# Patient Record
Sex: MALE | Race: WHITE | Employment: FULL TIME | URBAN - METROPOLITAN AREA
[De-identification: names, ages, dates, MRNs, and addresses within clinical notes are randomized per-mention and may not be internally consistent; named-entity substitution may affect disease eponyms.]

---
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
## 2022-01-03 ENCOUNTER — APPOINTMENT (OUTPATIENT)
Dept: PHYSICAL THERAPY | Age: 22
End: 2022-01-03
Payer: COMMERCIAL

## 2022-01-06 ENCOUNTER — HOSPITAL ENCOUNTER (OUTPATIENT)
Dept: PHYSICAL THERAPY | Age: 22
Setting detail: THERAPIES SERIES
Discharge: HOME | End: 2022-01-06
Payer: COMMERCIAL

## 2022-01-06 PROCEDURE — 97110 THERAPEUTIC EXERCISES: CPT

## 2022-01-06 ASSESSMENT — PAIN SCALES - GENERAL: PAINLEVEL_OUTOF10: 5

## 2022-01-06 ASSESSMENT — PAIN DESCRIPTION - DESCRIPTORS: DESCRIPTORS: ACHING

## 2022-01-06 ASSESSMENT — PAIN DESCRIPTION - PAIN TYPE: TYPE: CHRONIC PAIN

## 2022-01-06 ASSESSMENT — PAIN DESCRIPTION - LOCATION: LOCATION: NECK;BACK

## 2022-01-06 ASSESSMENT — PAIN DESCRIPTION - ORIENTATION: ORIENTATION: POSTERIOR

## 2022-01-06 NOTE — PROGRESS NOTES
[address] [facility]  Outpatient Physical Therapy    Treatment Note        Date: 2022  Patient: Zari Rubi  : 2000  ACCT #: [de-identified]  Referring Practitioner: VINNY Naqvi-CINTIA Bray  Diagnosis: Neck pain, chronic bilateral low back pain  Treatment Diagnosis: Cervical and low back pain with soft tissue tightness in the thoracic regions     Visit Information:  PT Visit Information  Onset Date: 21  PT Insurance Information: [employer]  Total # of Visits to Date: 2  Plan of Care/Certification Expiration Date: 21  No Show: 0  Canceled Appointment: 0  Progress Note Counter: 2//10    Subjective: Pt reports general soreness following last tx session. Pain generally worse at night following work, return to base line pain levels by morning. Oftern feels as thought my left hand doesn't work like I can't grab anything or hold anything with it. HEP Compliance:  [] Good [x] Fair [] Poor [] Reports not doing due to:    Vital Signs  Patient Currently in Pain: Yes   Pain Screening  Patient Currently in Pain: Yes  Pain Assessment  Pain Assessment: 0-10  Pain Level: 5  Pain Type: Chronic pain  Pain Location: Neck;Back  Pain Orientation: Posterior  Pain Descriptors: Aching    OBJECTIVE:   Exercises  Exercise 1: Supine chin Tucks / Scap retract / Elbow press  3s hold x 10  Exercise 2: SL open Book Stretch. 3s x 10 Vic. Exercise 3: Seated Ext in Chair 3s hold x 10  Exercise 4: Standing Ex against Table 3s hold x 10  Exercise 5: Corner Stretch 30s x 3 hand at head level. Exercise 6: Prone 2 min / LEATHA 2 min. Prone Press Up 3s hold  Exercise 7: prone Scap Series Horz ABD IR/ER , Lower Trap , Ext  Exercise 20: HEP: Open Book, Seated Ext, Standing Ext. Strength: [x] NT  [] MMT completed:     ROM: [x] NT  [] ROM measurements:        Assessment:         Body structures, Functions, Activity limitations: Decreased functional mobility ,Decreased strength,Decreased posture,Increased pain  Assessment: Multiple additions made to exercises plan with focus on mobility and gentle pain free strengthing of both back and cervical spines. Pt displayed limtied throacic rotation with Open Book exercises. Good ROM noted with Prone progression with increased comfort reported by pt. Treatment Diagnosis: Cervical and low back pain with soft tissue tightness in the thoracic regions  Prognosis: Good       Goals:  Short term goals  Time Frame for Short term goals: 3-5 treatments  Short term goal 1: Patient will have improved posture requiring less than 2 verbal  cues per session  Short term goal 2: No further scapular or upper back soft tissue tightness  Short term goal 3: Pain of the cervical area will be decreased to 2/20    Long term goals  Time Frame for Long term goals : 5-10 treatments  Long term goal 1: Pain of the cervical area decreased to 1/10 to 0/10  Long term goal 2: Pain of the low back area will be decreased to 1/10 to 0/10  Long term goal 3: Patient will demonstrate good upper body posture with no verbal cues  Long term goal 4:  strength of the left will improve by at least 10 points per position and more comparable to norms  Long term goal 5: Patient will be independent with home exercise program  Progress toward goals:multiple progressions made to exercise program and HEP. POST-PAIN       Pain Rating (0-10 pain scale):   4/10   Location and pain description same as pre-treatment unless indicated. Action: [] NA   [x] Perform HEP  [] Meds as prescribed  [] Modalities as prescribed   [] Call Physician     Frequency/Duration:  Plan  Times per week: 2  Plan weeks: 5  Current Treatment Recommendations: Strengthening,Pain Management,Manual Therapy - Soft Tissue Mobilization,Home Exercise Program     Pt to continue current HEP. See objective section for any therapeutic exercise changes, additions or modifications this date.          PT Individual Minutes  Time In: 1640  Time Out: 3242  Minutes: 40  Timed Code Treatment Minutes: 40 Minutes  Procedure Minutes:0     Timed Activity Minutes Units   Ther Ex 40 3       Signature:  Electronically signed by Adrianna Brown PTA on 1/6/22 at 5:28 PM EST

## 2022-01-12 ENCOUNTER — HOSPITAL ENCOUNTER (OUTPATIENT)
Dept: PHYSICAL THERAPY | Age: 22
Setting detail: THERAPIES SERIES
Discharge: HOME | End: 2022-01-12
Payer: COMMERCIAL

## 2022-01-12 PROCEDURE — 97110 THERAPEUTIC EXERCISES: CPT

## 2022-01-12 PROCEDURE — 97140 MANUAL THERAPY 1/> REGIONS: CPT

## 2022-01-12 ASSESSMENT — PAIN DESCRIPTION - ORIENTATION: ORIENTATION: POSTERIOR

## 2022-01-12 ASSESSMENT — PAIN DESCRIPTION - PAIN TYPE: TYPE: CHRONIC PAIN

## 2022-01-12 ASSESSMENT — PAIN SCALES - GENERAL: PAINLEVEL_OUTOF10: 5

## 2022-01-12 ASSESSMENT — PAIN DESCRIPTION - DESCRIPTORS: DESCRIPTORS: ACHING

## 2022-01-12 ASSESSMENT — PAIN DESCRIPTION - LOCATION: LOCATION: NECK;BACK

## 2022-01-12 NOTE — PROGRESS NOTES
[address]  Outpatient Physical Therapy    Treatment Note        Date: 2022  Patient: Gustavo Rubi  : 2000  ACCT #: [de-identified]  Referring Practitioner: VINNY Xiao-CINTIA Rudolph  Diagnosis: Neck pain, chronic bilateral low back pain  Treatment Diagnosis: Cervical and low back pain with soft tissue tightness in the thoracic regions     Visit Information:  PT Visit Information  Onset Date: 21  PT Insurance Information: [employer]  Total # of Visits to Date: 3  Plan of Care/Certification Expiration Date: 22  No Show: 0  Canceled Appointment: 0  Progress Note Counter: 3/10 (6/40 units by 22)    Subjective: Pt reports \"my neck and back are both about a 5/10 today. 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 Pt states he felt good after last session, but forgets to do his HEP most days. HEP Compliance:  [] Good [] Fair [x] Poor [x] Reports not doing due to: \"I forget\"    Vital Signs  Patient Currently in Pain: Yes   Pain Screening  Patient Currently in Pain: Yes  Pain Assessment  Pain Assessment: 0-10  Pain Level: 5  Pain Type: Chronic pain  Pain Location: Neck;Back  Pain Orientation: Posterior  Pain Descriptors: Aching    OBJECTIVE:   Exercises  Exercise 1: Supine chin Tucks / Scap retract / Elbow press  3s hold x 10  Exercise 2: SL open Book Stretch. 3s x 10 Vic. Exercise 3: Seated Ext in Chair 3s hold x 10  Exercise 4: Standing Ext against Table 3s hold x 10  Exercise 5: Corner Stretch 30s x 3 hand at head level. Exercise 6: Prone 2 min / LEATHA 2 min.  Prone Press Up 3s hold x10  Exercise 7: prone Scap Series Horz ABD IR/ER , Lower Trap , Ext x15 ea  Exercise 8: quadruped cat/cow 5''x10  Exercise 9: wall slides w/ lift off 5''x10  Exercise 20: HEP: cont current, increase compliance    Strength: [x] NT  [] MMT completed:    ROM: [x] NT  [] ROM measurements:     Manual:   Manual therapy  Soft Tissue Mobalization: tennis ball massage to thoracic paraspinals x10 mins    *Indicates exercise, modality, or manual techniques to be initiated when appropriate    Assessment: Body structures, Functions, Activity limitations: Decreased functional mobility ,Decreased strength,Decreased posture,Increased pain  Assessment: Encouragement and education given re: HEP compliance. Initiated cat/cow w/ pt demo'ing signficant limited thoracolumbar ext. Pt also requires VC/TC for correct technique. Treatment Diagnosis: Cervical and low back pain with soft tissue tightness in the thoracic regions  Prognosis: Good     Goals:  Short term goals  Time Frame for Short term goals: 3-5 treatments  Short term goal 1: Patient will have improved posture requiring less than 2 verbal  cues per session  Short term goal 2: No further scapular or upper back soft tissue tightness  Short term goal 3: Pain of the cervical area will be decreased to 2/20  Long term goals  Time Frame for Long term goals : 5-10 treatments  Long term goal 1: Pain of the cervical area decreased to 1/10 to 0/10  Long term goal 2: Pain of the low back area will be decreased to 1/10 to 0/10  Long term goal 3: Patient will demonstrate good upper body posture with no verbal cues  Long term goal 4:  strength of the left will improve by at least 10 points per position and more comparable to norms  Long term goal 5: Patient will be independent with home exercise program  Progress toward goals: dec tightness, improve postural awareness, dec pain    POST-PAIN       Pain Rating (0-10 pain scale):   4/10   Location and pain description same as pre-treatment unless indicated.    Action: [] NA   [x] Perform HEP  [] Meds as prescribed  [] Modalities as prescribed   [] Call Physician     Frequency/Duration:  Plan  Times per week: 2  Plan weeks: 5  Current Treatment Recommendations: Strengthening,Pain Management,Manual Therapy - Soft Tissue Mobilization,Home Exercise Program     Pt to

## 2022-01-13 ENCOUNTER — HOSPITAL ENCOUNTER (OUTPATIENT)
Dept: PHYSICAL THERAPY | Age: 22
Setting detail: THERAPIES SERIES
Discharge: HOME | End: 2022-01-13
Payer: COMMERCIAL

## 2022-01-13 PROCEDURE — 97110 THERAPEUTIC EXERCISES: CPT

## 2022-01-13 ASSESSMENT — PAIN SCALES - GENERAL: PAINLEVEL_OUTOF10: 2

## 2022-01-13 ASSESSMENT — PAIN DESCRIPTION - LOCATION: LOCATION: BACK

## 2022-01-13 ASSESSMENT — PAIN DESCRIPTION - PAIN TYPE: TYPE: CHRONIC PAIN

## 2022-01-13 ASSESSMENT — PAIN DESCRIPTION - ORIENTATION: ORIENTATION: UPPER

## 2022-01-13 ASSESSMENT — PAIN DESCRIPTION - DESCRIPTORS: DESCRIPTORS: ACHING

## 2022-01-13 NOTE — PROGRESS NOTES
[address] [facility]  Outpatient Physical Therapy    Treatment Note        Date: 2022  Patient: Edvin Gomez  : 2000  ACCT #: [de-identified]  Referring Practitioner: NOREEN Roberts  Diagnosis: Neck pain, chronic bilateral low back pain  Treatment Diagnosis: Cervical and low back pain with soft tissue tightness in the thoracic regions     Visit Information:  PT Visit Information  Onset Date: 21  PT Insurance Information: [employer]  Total # of Visits to Date: 4  Plan of Care/Certification Expiration Date: 22  No Show: 0  Canceled Appointment: 0  Progress Note Counter: 4/10 (9/40 units by 22)    Subjective: Not much pain today, mostly right between the shoulders today and mostly just in the right     HEP Compliance:  [] Good [x] Fair [] Poor [] Reports not doing due to:    Vital Signs  Patient Currently in Pain: Yes   Pain Screening  Patient Currently in Pain: Yes  Pain Assessment  Pain Assessment: 0-10  Pain Level: 2  Pain Type: Chronic pain  Pain Location: Back  Pain Orientation: Upper  Pain Descriptors: Aching    OBJECTIVE:   Exercises  Exercise 2: SL open Book Stretch. 5s x 10 Vic. Exercise 4: Standing Ext against Table 3s hold x 10  Exercise 5: Corner Stretch 30s x 3 hand at head level. Exercise 6: LEATHA 3 min. Prone Press Up 3s hold x10  Exercise 8: quadruped cat/cow 5''x10  Exercise 9: Quadruped DLS 3-way x 10 ea. Exercise 10: UBE Retro L 2.0 x 5 min  Exercise 11: T-band Yellow Rows / Lat Pulls 3s hold x 10  Exercise 12: T-band Yellow Chest Pulls / Vic ER x 10 ea. Exercise 13: wall slides w/ lift off 5''x10  Exercise 20: HEP: Tband rows, lats, chest pull, Vic ER       Strength: [x] NT  [] MMT completed:     ROM: [x] NT  [] ROM measurements:     Assessment:         Body structures, Functions, Activity limitations: Decreased functional mobility ,Decreased strength,Decreased posture,Increased pain  Assessment: Progression of exercises to include therband work for posterior shoulder strenghting and progression of quadruped exercises to promote improved spinal strenght / stabiltiy. Pt reports no change in pain levels post exercise at this time. Treatment Diagnosis: Cervical and low back pain with soft tissue tightness in the thoracic regions  Prognosis: Good       Goals:  Short term goals  Time Frame for Short term goals: 3-5 treatments  Short term goal 1: Patient will have improved posture requiring less than 2 verbal  cues per session  Short term goal 2: No further scapular or upper back soft tissue tightness  Short term goal 3: Pain of the cervical area will be decreased to 2/20    Long term goals  Time Frame for Long term goals : 5-10 treatments  Long term goal 1: Pain of the cervical area decreased to 1/10 to 0/10  Long term goal 2: Pain of the low back area will be decreased to 1/10 to 0/10  Long term goal 3: Patient will demonstrate good upper body posture with no verbal cues  Long term goal 4:  strength of the left will improve by at least 10 points per position and more comparable to norms  Long term goal 5: Patient will be independent with home exercise program  Progress toward goals:    POST-PAIN       Pain Rating (0-10 pain scale):   2/10   Location and pain description same as pre-treatment unless indicated. Action: [] NA   [x] Perform HEP  [] Meds as prescribed  [] Modalities as prescribed   [] Call Physician     Frequency/Duration:  Plan  Times per week: 2  Plan weeks: 5  Current Treatment Recommendations: Strengthening,Pain Management,Manual Therapy - Soft Tissue Mobilization,Home Exercise Program     Pt to continue current HEP. See objective section for any therapeutic exercise changes, additions or modifications this date.          PT Individual Minutes  Time In: 2285  Time Out: [address]  Minutes: 38  Timed Code Treatment Minutes: 38 Minutes  Procedure Minutes:0     Timed Activity Minutes Units   Ther Ex 38 3       Signature: Electronically signed by Cornelio Calzada PTA on 1/13/22 at 4:58 PM EST

## 2022-01-17 ENCOUNTER — HOSPITAL ENCOUNTER (OUTPATIENT)
Dept: PHYSICAL THERAPY | Age: 22
Setting detail: THERAPIES SERIES
Discharge: HOME | End: 2022-01-17
Payer: COMMERCIAL

## 2022-01-17 PROCEDURE — 97110 THERAPEUTIC EXERCISES: CPT

## 2022-01-17 ASSESSMENT — PAIN DESCRIPTION - PAIN TYPE: TYPE: CHRONIC PAIN

## 2022-01-17 ASSESSMENT — PAIN SCALES - GENERAL: PAINLEVEL_OUTOF10: 2

## 2022-01-17 ASSESSMENT — PAIN DESCRIPTION - DESCRIPTORS: DESCRIPTORS: ACHING

## 2022-01-17 ASSESSMENT — PAIN DESCRIPTION - LOCATION: LOCATION: BACK

## 2022-01-17 ASSESSMENT — PAIN DESCRIPTION - ORIENTATION: ORIENTATION: RIGHT;UPPER

## 2022-01-17 NOTE — PROGRESS NOTES
[address] [facility]  Outpatient Physical Therapy    Treatment Note        Date: 2022  Patient: Jd Alvarado  : 2000  ACCT #: [de-identified]  Referring Practitioner: NOREEN Hollis  Diagnosis: Neck pain, chronic bilateral low back pain  Treatment Diagnosis: Cervical and low back pain with soft tissue tightness in the thoracic regions     Visit Information:  PT Visit Information  Onset Date: 21  PT Insurance Information: [employer]  Total # of Visits to Date: 5  Plan of Care/Certification Expiration Date: 22  No Show: 0  Canceled Appointment: 0  Progress Note Counter: 5/10 (12/40 units by 22)     Subjective: Pt reports \"it's not too bad today, maybe a 2/10. \"     HEP Compliance:  [x] Good [] Fair [] Poor [] Reports not doing due to:    Vital Signs  Patient Currently in Pain: Yes   Pain Screening  Patient Currently in Pain: Yes  Pain Assessment  Pain Assessment: 0-10  Pain Level: 2  Pain Type: Chronic pain  Pain Location: Back  Pain Orientation: Right;Upper  Pain Descriptors: Aching    OBJECTIVE:   Exercises  Exercise 2: SL open Book Stretch. 5s x 10 Vic. Exercise 4: standing lumbar ext x10  Exercise 5: Corner Stretch 30s x 3 hand at head level. Exercise 8: quadruped cat/cow 5''x10  Exercise 9: Quadruped DLS 3-way x 10 ea. Exercise 10: UBE Retro L 3.5 x 5 min  Exercise 11: T-band green Rows / Lat Pulls 3s hold x 10  Exercise 12: T-band red Chest Pulls (IR, ER, diagonals)/ Vic ER x 10 ea. Exercise 13: wall slides w/ lift off 5''x10  Exercise 19: pt will be standing all day for new job, discussed paying attention to postural awareness w/ pt verbalizing understanding    Strength: [] NT  [x] MMT completed:   Nico  test: left: 90, 105, 111, 103, 91    ROM: [x] NT  [] ROM measurements:     *Indicates exercise, modality, or manual techniques to be initiated when appropriate    Assessment:    Body structures, Functions, Activity limitations: Decreased functional mobility ,Decreased strength,Decreased posture,Increased pain  Assessment: Pt continues to tolerate progressions well for improved postural awareness w/ decreasing cues required. Pt observed multiple times throughout session to self-correct posture without cues. Discussed standing postural awareness and proper footwear as pt will be required to stand for long periods of time with new job. Treatment Diagnosis: Cervical and low back pain with soft tissue tightness in the thoracic regions  Prognosis: Good     Goals:  Short term goals  Time Frame for Short term goals: 3-5 treatments  Short term goal 1: Patient will have improved posture requiring less than 2 verbal  cues per session  Short term goal 2: No further scapular or upper back soft tissue tightness  Short term goal 3: Pain of the cervical area will be decreased to 2/20  Long term goals  Time Frame for Long term goals : 5-10 treatments  Long term goal 1: Pain of the cervical area decreased to 1/10 to 0/10  Long term goal 2: Pain of the low back area will be decreased to 1/10 to 0/10  Long term goal 3: Patient will demonstrate good upper body posture with no verbal cues  Long term goal 4:  strength of the left will improve by at least 10 points per position and more comparable to norms  Long term goal 5: Patient will be independent with home exercise program  Progress toward goals: dec pain, improve postural awareness, increase  strength    POST-PAIN       Pain Rating (0-10 pain scale):  0-1 /10   Location and pain description same as pre-treatment unless indicated.    Action: [] NA   [x] Perform HEP  [] Meds as prescribed  [] Modalities as prescribed   [] Call Physician     Frequency/Duration:  Plan  Times per week: 2  Plan weeks: 5  Current Treatment Recommendations: Strengthening,Pain Management,Manual Therapy - Soft Tissue Mobilization,Home Exercise Program  Plan Comment: pt to call to schedule once he finds out work schedule     Pt to continue current HEP. See objective section for any therapeutic exercise changes, additions or modifications this date.     PT Individual Minutes  Time In: 5561  Time Out: 7762  Minutes: 38  Timed Code Treatment Minutes: 38 Minutes  Procedure Minutes: 0     Timed Activity Minutes Units   Ther Ex 38 3     Signature:  Electronically signed by Hipolito Garcia PTA on 1/17/22 at 4:44 PM EST

## 2022-01-19 ENCOUNTER — APPOINTMENT (OUTPATIENT)
Dept: PHYSICAL THERAPY | Age: 22
End: 2022-01-19
Payer: COMMERCIAL

## 2022-02-04 ENCOUNTER — HOSPITAL ENCOUNTER (OUTPATIENT)
Dept: PHYSICAL THERAPY | Age: 22
Setting detail: THERAPIES SERIES
Discharge: HOME | End: 2022-02-04
Payer: COMMERCIAL

## 2022-02-04 NOTE — PROGRESS NOTES
Therapy                            Cancellation/No-show Note      Date:  2022  Patient Name:  Kobe Morales  :  2000   MRN:  [id number]  Referring Practitioner: NOREEN Vásquez  Diagnosis: Neck pain, chronic bilateral low back pain    Visit Information:  PT Visit Information  Onset Date: 21  PT Insurance Information: [employer]  Total # of Visits to Date: 5  Plan of Care/Certification Expiration Date: 22  No Show: 0  Canceled Appointment: 1  Progress Note Counter: 5/10 ( units by 22) cx 22    For today's appointment patient:  [x]  Cancelled  []  Rescheduled appointment  []  No-show   []  Called pt to remind of next appointment     Reason given by patient:  []  Patient ill  []  Conflicting appointment  []  No transportation    []  Conflict with work  []  No reason given  [x]  Other:  Inclement weather    [x] Pt has future appointments scheduled, no follow up needed  [] Pt requests to be on hold.     Reason:   If > 2 weeks please discuss with therapist.  [] Therapist to call pt for follow up     Comments:       Signature: Electronically signed by Dwight Pabon PTA on 22 at 12:30 PM EST

## 2022-02-07 ENCOUNTER — HOSPITAL ENCOUNTER (OUTPATIENT)
Dept: PHYSICAL THERAPY | Age: 22
Setting detail: THERAPIES SERIES
Discharge: HOME | End: 2022-02-07
Payer: COMMERCIAL

## 2022-02-07 PROCEDURE — 97110 THERAPEUTIC EXERCISES: CPT

## 2022-02-07 ASSESSMENT — PAIN DESCRIPTION - LOCATION: LOCATION: BACK

## 2022-02-07 ASSESSMENT — PAIN DESCRIPTION - ORIENTATION: ORIENTATION: RIGHT;UPPER

## 2022-02-07 ASSESSMENT — PAIN DESCRIPTION - PAIN TYPE: TYPE: CHRONIC PAIN

## 2022-02-07 ASSESSMENT — PAIN SCALES - GENERAL: PAINLEVEL_OUTOF10: 1

## 2022-02-07 ASSESSMENT — PAIN DESCRIPTION - DESCRIPTORS: DESCRIPTORS: ACHING

## 2022-02-07 NOTE — PROGRESS NOTES
[address] [place] [facility]  Outpatient Physical Therapy    Treatment Note        Date: 2022  Patient: Armani Mojica  : 2000  ACCT #: [de-identified]  Referring Practitioner: NOREEN Aguilar  Diagnosis: Neck pain, chronic bilateral low back pain  Treatment Diagnosis: Cervical and low back pain with soft tissue tightness in the thoracic regions     Visit Information:  PT Visit Information  Onset Date: 21  PT Insurance Information: [employer]  Total # of Visits to Date: 6  Plan of Care/Certification Expiration Date: 22  No Show: 0  Canceled Appointment: 1  Progress Note Counter: 6/10 (15/40 units by 22)    Subjective: Pt reports \"I haven't really been in too much pain. \" Pt reports he has \"some bad days\" roughly 1-2x/week compared to multiple. Pt states he would like to finish his current therapy sessions to progress HEP. HEP Compliance:  [x] Good [] Fair [] Poor [] Reports not doing due to:    Vital Signs  Patient Currently in Pain: Yes   Pain Screening  Patient Currently in Pain: Yes  Pain Assessment  Pain Assessment: 0-10  Pain Level: 1  Pain Type: Chronic pain  Pain Location: Back  Pain Orientation: Right;Upper  Pain Descriptors: Aching    OBJECTIVE:   Exercises  Exercise 5: Corner Stretch 30s x 3 hand at head level. Exercise 7: prone Scap Series Horz ABD IR/ER , Lower Trap , Ext x15 ea  Exercise 8: quadruped cat/cow 5''x10  Exercise 9: Quadruped DLS 3-way x 10 ea. Exercise 10: UBE Retro L 4.0 x 5 min  Exercise 11: T-band green Rows / Lat Pulls 3s hold x 15  Exercise 12: T-band red Chest Pulls (IR, ER, diagonals)/ Vic ER x 10 ea.   Exercise 13: wall slides w/ lift off 5''x15  Exercise 14: pallof press GTB 3''x10  Exercise 20: all of the above ex's were given to pt as HEP w/ GTB & RTB    Strength: [x] NT  [] MMT completed:    ROM: [x] NT  [] ROM measurements:     Manual:   Manual therapy  Other: minimal to no muscle tightness/restriction palpated in upper back/scap    *Indicates exercise, modality, or manual techniques to be initiated when appropriate    Assessment: Body structures, Functions, Activity limitations: Decreased functional mobility ,Decreased strength,Decreased posture,Increased pain  Assessment: Pt w/ recent lapse in tx d/t changing work schedule. Despite lapse in tx, pt's pain is overall improving w/ less \"bad days\" than before beginning PT. Initiated and progressed multiple exercises for increased postural awareness and decreased pain. Progressed HEP w/ pt verbalizing understanding. Treatment Diagnosis: Cervical and low back pain with soft tissue tightness in the thoracic regions  Prognosis: Good     Goals:  Short term goals  Time Frame for Short term goals: 3-5 treatments  Short term goal 1: Patient will have improved posture requiring less than 2 verbal  cues per session (met 2/7/22)  Short term goal 2: No further scapular or upper back soft tissue tightness (met 2/7/22)  Short term goal 3: Pain of the cervical area will be decreased to 2/20 (met 2/7/22)  Long term goals  Time Frame for Long term goals : 5-10 treatments  Long term goal 1: Pain of the cervical area decreased to 1/10 to 0/10  Long term goal 2: Pain of the low back area will be decreased to 1/10 to 0/10  Long term goal 3: Patient will demonstrate good upper body posture with no verbal cues  Long term goal 4:  strength of the left will improve by at least 10 points per position and more comparable to norms  Long term goal 5: Patient will be independent with home exercise program  Progress toward goals: inc strength, ROM, decrease pain     POST-PAIN       Pain Rating (0-10 pain scale):  0 /10   Location and pain description same as pre-treatment unless indicated.    Action: [] NA   [x] Perform HEP  [] Meds as prescribed  [] Modalities as prescribed   [] Call Physician     Frequency/Duration:  Plan  Times per week: 2  Plan weeks: 5  Current Treatment Recommendations: Strengthening,Pain Management,Manual Therapy - Soft Tissue Mobilization,Home Exercise Program     Pt to continue current HEP. See objective section for any therapeutic exercise changes, additions or modifications this date.     PT Individual Minutes  Time In: 0805  Time Out: 3166  Minutes: 41  Timed Code Treatment Minutes: 41 Minutes  Procedure Minutes: 0     Timed Activity Minutes Units   Ther Ex 41 3     Signature:  Electronically signed by Ronal Zayas PTA on 2/7/22 at 8:05 AM EST

## 2022-02-16 ENCOUNTER — HOSPITAL ENCOUNTER (OUTPATIENT)
Dept: PHYSICAL THERAPY | Age: 22
Setting detail: THERAPIES SERIES
Discharge: HOME | End: 2022-02-16
Payer: COMMERCIAL

## 2022-02-16 PROCEDURE — 97110 THERAPEUTIC EXERCISES: CPT

## 2022-02-16 NOTE — PROGRESS NOTES
[address] [facility]  Outpatient Physical Therapy    Treatment Note        Date: 2022  Patient: Augusto Mistry  : 2000  ACCT #: [de-identified]  Referring Practitioner: VINNY Mane-CINTIA Curtis)  Diagnosis: Neck pain, chronic bilateral low back pain  Treatment Diagnosis: Cervical and low back pain with soft tissue tightness in the thoracic regions     Visit Information:  PT Visit Information  Onset Date: 21  PT Insurance Information: [employer]  Total # of Visits to Date: 7  Plan of Care/Certification Expiration Date: 22  No Show: 1  Canceled Appointment: 1  Progress Note Counter: 7/10 (15/40 units by 22)    Subjective: Pt states \"my back doesn't hurt, but my L shoulder is really bothering me when I put weight through it. \" Pt points to L distal UT/superior border of scapula region. Pt states he's unsure what caused it, but it started 3-4 days ago. HEP Compliance:  [x] Good [] Fair [] Poor [] Reports not doing due to:    Vital Signs  Patient Currently in Pain: Denies   Pain Screening  Patient Currently in Pain: Denies    OBJECTIVE:   Exercises  Exercise 5: Corner Stretch 30s x 3 hand at head level. Exercise 7: prone Scap Series Horz ABD IR/ER , Lower Trap , Ext x15 ea 5#  Exercise 10: UBE L 4.0 Fwd 2.5'/retro 2.5'  Exercise 11: T-band green Rows / Lat Pulls 3s hold x 15  Exercise 12: T-band red Chest Pulls (IR, ER, diagonals)/ Vic ER x 12 ea. Exercise 13: wall slides w/ lift off 5''x15  Exercise 14: pallof press GTB 3''x15  Exercise 15: R UT and LS stretch w/ overpressure 30''x3 ea  Exercise 16: look away stretch at tower 30''x3 ea    Strength: [x] NT  [] MMT completed:    ROM: [x] NT  [] ROM measurements:       *Indicates exercise, modality, or manual techniques to be initiated when appropriate    Assessment:    Body structures, Functions, Activity limitations: Decreased functional mobility ,Decreased strength,Decreased posture,Increased pain  Assessment: Initiated select exercises to target cervical tightness for decreased pain w/ pt reporting some relief and improved L shoulder mobility. Pt able to complete all therex w/o complaint and denied any pain in either region post-tx. Treatment Diagnosis: Cervical and low back pain with soft tissue tightness in the thoracic regions  Prognosis: Good     Goals:  Short term goals  Time Frame for Short term goals: 3-5 treatments  Short term goal 1: Patient will have improved posture requiring less than 2 verbal  cues per session (met 2/7/22)  Short term goal 2: No further scapular or upper back soft tissue tightness (met 2/7/22)  Short term goal 3: Pain of the cervical area will be decreased to 2/20 (met 2/7/22)  Long term goals  Time Frame for Long term goals : 5-10 treatments  Long term goal 1: Pain of the cervical area decreased to 1/10 to 0/10  Long term goal 2: Pain of the low back area will be decreased to 1/10 to 0/10  Long term goal 3: Patient will demonstrate good upper body posture with no verbal cues  Long term goal 4:  strength of the left will improve by at least 10 points per position and more comparable to norms  Long term goal 5: Patient will be independent with home exercise program  Progress toward goals: improve postural awareness    POST-PAIN       Pain Rating (0-10 pain scale):   0/10   Location and pain description same as pre-treatment unless indicated. Action: [] NA   [x] Perform HEP  [] Meds as prescribed  [] Modalities as prescribed   [] Call Physician     Frequency/Duration:  Plan  Times per week: 2  Plan weeks: 5  Current Treatment Recommendations: Strengthening,Pain Management,Manual Therapy - Soft Tissue Mobilization,Home Exercise Program     Pt to continue current HEP. See objective section for any therapeutic exercise changes, additions or modifications this date.     PT Individual Minutes  Time In: 1599  Time Out: 1428  Minutes: 40  Timed Code Treatment Minutes: 40 Minutes  Procedure Minutes: 0     Timed Activity Minutes Units   Ther Ex 40 3     Signature:  Electronically signed by Teresa Vasquez PTA on 2/16/22 at 1:54 PM EST

## 2022-02-18 ENCOUNTER — HOSPITAL ENCOUNTER (OUTPATIENT)
Dept: PHYSICAL THERAPY | Age: 22
Setting detail: THERAPIES SERIES
Discharge: HOME | End: 2022-02-18
Payer: COMMERCIAL

## 2022-02-18 PROCEDURE — 97110 THERAPEUTIC EXERCISES: CPT

## 2022-02-18 NOTE — PROGRESS NOTES
[address] [facility]  Outpatient Physical Therapy    Treatment Note        Date: 2022  Patient: Armani Mojica  : 2000  ACCT #: [de-identified]  Referring Practitioner: NOREEN Aguilar  Diagnosis: Neck pain, chronic bilateral low back pain  Treatment Diagnosis: Cervical and low back pain with soft tissue tightness in the thoracic regions     Visit Information:  PT Visit Information  Onset Date: 21  PT Insurance Information: [employer]  Total # of Visits to Date: 8  Plan of Care/Certification Expiration Date: 22  No Show: 1  Canceled Appointment: 1  Progress Note Counter: 8/10 (18/40 units by 22)    Subjective: Pt reports on pain at this time. Good compliance with HEP with no complaints. HEP Compliance:  [x] Good [] Fair [] Poor [] Reports not doing due to:    Vital Signs  Patient Currently in Pain: Denies   Pain Screening  Patient Currently in Pain: Denies    OBJECTIVE:   Exercises  Exercise 5: Corner Stretch 30s x 3 hand at head level. Exercise 7: prone Scap Series Horz ABD IR/ER , Lower Trap , Ext x15 ea 5#  Exercise 10: UBE L 4.0 Fwd 2.5'/retro 2.5'  Exercise 11: Apollo 30#  Rows / Lat Pulls 3s hold x 15  Exercise 12: T-band Green  Chest Pulls (IR, ER, diagonals)/ Vic ER x 12 ea. Exercise 14: Apollo pallof press 30#  3''x15 ,  Rotation, wood chop, Reverse Wood Chop x 10 ea. Exercise 15: R UT and LS stretch w/ overpressure 30''x3 ea  Exercise 16: look away stretch at tower 30''x3 ea         Strength: [] NT  [x] MMT completed:              Strength Other  Other:  Strength Right 115, 105, 103, 112, 112. Left 125, 95, 65, 105, 110    ROM: [x] NT  [] ROM measurements:       Assessment: Body structures, Functions, Activity limitations: Decreased functional mobility ,Decreased strength,Decreased posture,Increased pain  Assessment: Progressed multiple exercises with increased weights and resistance to further improve strengh and stability. Pt with no reported neck pain for 10 days or more, while pt reports x1 day of back pain of 4/10 for short period that cleared with rest otherwise no reported back pain within same time frame. Treatment Diagnosis: Cervical and low back pain with soft tissue tightness in the thoracic regions  Prognosis: Good       Goals:  Short term goals  Time Frame for Short term goals: 3-5 treatments  Short term goal 1: Patient will have improved posture requiring less than 2 verbal  cues per session (met 2/7/22)  Short term goal 2: No further scapular or upper back soft tissue tightness (met 2/7/22)  Short term goal 3: Pain of the cervical area will be decreased to 2/20 (met 2/7/22)    Long term goals  Time Frame for Long term goals : 5-10 treatments  Long term goal 1: Pain of the cervical area decreased to 1/10 to 0/10  Long term goal 2: Pain of the low back area will be decreased to 1/10 to 0/10  Long term goal 3: Patient will demonstrate good upper body posture with no verbal cues  Long term goal 4:  strength of the left will improve by at least 10 points per position and more comparable to norms  Long term goal 5: Patient will be independent with home exercise program  Progress toward goals:improved strength     POST-PAIN       Pain Rating (0-10 pain scale):   0/10   Location and pain description same as pre-treatment unless indicated. Action: [] NA   [x] Perform HEP  [] Meds as prescribed  [] Modalities as prescribed   [] Call Physician     Frequency/Duration:  Plan  Times per week: 2  Plan weeks: 5  Current Treatment Recommendations: Strengthening,Pain Management,Manual Therapy - Soft Tissue Mobilization,Home Exercise Program     Pt to continue current HEP. See objective section for any therapeutic exercise changes, additions or modifications this date.          PT Individual Minutes  Time In: 4315  Time Out: 1427  Minutes: 41  Timed Code Treatment Minutes: 41 Minutes  Procedure Minutes: 0     Timed Activity Minutes Units   Ther Ex 41 3       Signature:  Electronically signed by Lenny Wan PTA on 2/18/22 at 2:30 PM EST

## 2022-02-21 ENCOUNTER — HOSPITAL ENCOUNTER (OUTPATIENT)
Dept: PHYSICAL THERAPY | Age: 22
Setting detail: THERAPIES SERIES
Discharge: HOME | End: 2022-02-21
Payer: COMMERCIAL

## 2022-02-21 PROCEDURE — 97110 THERAPEUTIC EXERCISES: CPT

## 2022-02-21 NOTE — PROGRESS NOTES
[address] [facility]  Outpatient Physical Therapy    Treatment Note        Date: 2022  Patient: Louisa Jacome  : 2000  ACCT #: [de-identified]  Referring Practitioner: NOREEN Armenta  Diagnosis: Neck pain, chronic bilateral low back pain  Treatment Diagnosis: Cervical and low back pain with soft tissue tightness in the thoracic regions     Visit Information:  PT Visit Information  Onset Date: 21  PT Insurance Information: [employer]  Total # of Visits to Date: 9  Plan of Care/Certification Expiration Date: 22  No Show: 1  Canceled Appointment: 1  Progress Note Counter: 9/10 (21/40 units by 22)    Subjective: No pain or complaints, no issues following progressions of exercise program at last visit. I'm getting a home gym next weekend     HEP Compliance:  [x] Good [] Fair [] Poor [] Reports not doing due to:    Vital Signs  Patient Currently in Pain: Denies   Pain Screening  Patient Currently in Pain: Denies    OBJECTIVE:   Exercises  Exercise 9: P-ball  DLS 3-way x 10 ea., Bridges 5s hold x 10, Vic. Leg lifts 3\" x 10 , Superman's Arm crossed at chest 3\" x 10  Exercise 10: UBE L 4.5 Fwd 2.5'/retro 2.5'  Exercise 11: Apollo 30#  High Rows / Lat Pulls 3s hold x 15  Exercise 14: Apollo pallof press 40#  5''x15 ,  Rotation, wood chop, Reverse Wood Chop x 10 ea. Exercise 17: Apollo Mid Row50#  2 x 10 , Lat  Pulls Big bar (wide ) 50# 2 x 10           Strength: [x] NT  [] MMT completed:        ROM: [x] NT  [] ROM measurements:        Assessment: Body structures, Functions, Activity limitations: Decreased functional mobility ,Decreased strength,Decreased posture,Increased pain  Assessment: Reviewed and performed higher level exercises for strength and stabiltiy of both low back and neck to improve pt's abiltiy to perform ADLs without pain and complaint.  Pt continues to report good complaince with HEP, no questions or concerns with new exercises, minimal cues for posture with exercises today with good carry over. Treatment Diagnosis: Cervical and low back pain with soft tissue tightness in the thoracic regions  Prognosis: Good       Goals:  Short term goals  Time Frame for Short term goals: 3-5 treatments  Short term goal 1: Patient will have improved posture requiring less than 2 verbal  cues per session (met 2/7/22)  Short term goal 2: No further scapular or upper back soft tissue tightness (met 2/7/22)  Short term goal 3: Pain of the cervical area will be decreased to 2/20 (met 2/7/22)    Long term goals  Time Frame for Long term goals : 5-10 treatments  Long term goal 1: Pain of the cervical area decreased to 1/10 to 0/10  Long term goal 2: Pain of the low back area will be decreased to 1/10 to 0/10  Long term goal 3: Patient will demonstrate good upper body posture with no verbal cues  Long term goal 4:  strength of the left will improve by at least 10 points per position and more comparable to norms  Long term goal 5: Patient will be independent with home exercise program  Progress toward goals:progression of exercises for continued strengh and endurance     POST-PAIN       Pain Rating (0-10 pain scale):  0 /10   Location and pain description same as pre-treatment unless indicated. Action: [] NA   [x] Perform HEP  [] Meds as prescribed  [] Modalities as prescribed   [] Call Physician     Frequency/Duration:  Plan  Times per week: 2  Plan weeks: 5  Current Treatment Recommendations: Strengthening,Pain Management,Manual Therapy - Soft Tissue Mobilization,Home Exercise Program  Plan Comment: Testing for discharge Next Visit. Pt to continue current HEP. See objective section for any therapeutic exercise changes, additions or modifications this date.          PT Individual Minutes  Time In: 3622  Time Out: 1054  Minutes: 39  Timed Code Treatment Minutes: 39 Minutes  Procedure Minutes:0   Timed Activity Minutes Units   Ther Ex 39 3     Signature: Electronically signed by Lenny Wan PTA on 2/21/22 at 11:00 AM EST

## 2022-02-24 ENCOUNTER — HOSPITAL ENCOUNTER (OUTPATIENT)
Dept: PHYSICAL THERAPY | Age: 22
Setting detail: THERAPIES SERIES
Discharge: HOME | End: 2022-02-24
Payer: COMMERCIAL

## 2022-02-24 PROCEDURE — 97164 PT RE-EVAL EST PLAN CARE: CPT | Performed by: PHYSICAL THERAPIST

## 2022-02-24 NOTE — PROGRESS NOTES
[address]  PHYSICAL THERAPY PLAN OF CARE   [address], [address] [facility]         Ph: [phone]  Fax: [phone]    [] Certification  [] Recertification []  Plan of Care  [] Progress Note [x] Discharge      To:  Luiz Stock, APRN-CINTIA Colon)      From:  Jb Wisdom PT  Patient: Alban Bhakta     : 2000  Diagnosis: Neck pain, chronic bilateral low back pain     Date: 2022  Treatment Diagnosis: Cervical and low back pain with soft tissue tightness in the thoracic regions    Plan of Care/Certification Expiration Date: 22  Progress Report Period from:  2022  to 2022    Total # of Visits to Date: 10   No Show: 0    Canceled Appointment: 1     OBJECTIVE:   Short Term Goals - Time Frame for Short term goals: 3-5 treatments    Goals Current/Discharge status  Met   Short term goal 1: Patient will have improved posture requiring less than 2 verbal  cues per session (met 22)  Currently does not need cues, maintains good posture [x] yes  [] no   Short term goal 2: No further scapular or upper back soft tissue tightness (met 22)  No scapular or upper back tightness palpated [x] yes  [] no   Short term goal 3: Pain of the cervical area will be decreased to 2/20 (met 22)  Currently no pain of the cervical area [x] yes  [] no       [] yes  [] no      []  yes  []  no     Long Term Goals - Time Frame for Long term goals : 5-10 treatments  Goals Current/ Discharge status Met   Long term goal 1: Pain of the cervical area decreased to 1/10 to 0/10 (goal met 2022) Currently no pain reported by patient of the cervical area [x] yes  [] no   Long term goal 2: Pain of the low back area will be decreased to 1/10 to 0/10 (goal met 22) Currently no pain reported by patient of the low back area [x] yes  [] no   Long term goal 3: Patient will demonstrate good upper body posture with no verbal cues (goal met 22) Able to maintain his posture without verbal cues for correction [x] yes  [] no   Long term goal 4:  strength of the left will improve by at least 10 points per position and more comparable to norms (goal met 2/24/22)  on the left:  75, 123, 115, 103, 87 improved from initial eval [x] yes  [] no   Long term goal 5: Patient will be independent with home exercise program (goal met 2/24/22) Patient is independent with home exercise program.  He was given black theraband for continued strengthening [x] yes  [] no       [] yes  [] no       [] yes  [] no        Assessment: Patient reports being pain free of his neck, upper back and lower back. He feels that he is stronger. He also reports that he will continue with his exercises at home. He has also changed jobs to one that is less taxing on his back and neck  Prognosis: Good      Patient Education: Given black theraband to continue with his strengthening exercises at home. PLAN: [] Evaluate and Treat  Frequency/Duration:  Plan  Plan Comment: Discharge from program due to goal attainment. Precautions:                            Patient Status:[] Continue/ Initiate plan of Care    [x] Discharge PT. Recommend pt continue with HEP. [] Additional visits requested, Please re-certify for additional visits:          Signature: Electronically signed by Merlin Pill, PT on 2/24/22 at 11:53 AM EST      If you have any questions or concerns, please don't hesitate to call. Thank you for your referral.    I have reviewed this plan of care and certify a need for medically necessary rehabilitation services.     Physician Signature:__________________________________________________________  Date:  Please sign and return

## 2022-02-24 NOTE — PROGRESS NOTES
[address] [facility]  Outpatient Physical Therapy    Treatment Note        Date: 2022  Patient: Kobe Morales  : 2000  ACCT #: [de-identified]  Referring Practitioner: NOREEN Vásquez  Diagnosis: Neck pain, chronic bilateral low back pain  Treatment Diagnosis: Cervical and low back pain with soft tissue tightness in the thoracic regions     Visit Information:  PT Visit Information  Onset Date: 21  PT Insurance Information: [employer]  Total # of Visits to Date: 10  Plan of Care/Certification Expiration Date: 22  No Show: 0  Canceled Appointment: 1  Progress Note Counter: 10/10   22/40 units by 22    Subjective: Patient reports no pain complaints of his neck or back. HEP Compliance:  [x] Good [] Fair [] Poor [] Reports not doing due to:    Vital Signs  Patient Currently in Pain: Denies   Pain Screening  Patient Currently in Pain: Denies    OBJECTIVE:      Strength: [] NT  [x] MMT completed:  Strength RLE  Strength RLE: WNL  Strength LLE  Strength LLE: WNL  Strength RUE  Strength RUE: WNL  Strength LUE  Strength LUE: WNL  Comment: MId and lower trap 5/5 bilaterally  Strength Other  Other: Left :  75, 123, 115, 103, 87    ROM: [] NT  [x] ROM measurements:     AROM RLE (degrees)  RLE AROM: WNL     AROM LLE (degrees)  LLE AROM : WNL     AROM RUE (degrees)  RUE AROM : WNL     AROM LUE (degrees)  LUE AROM : WNL  Spine  Cervical: All motions within functional limits  Lumbar: All motions WNL      *Indicates exercise, modality, or manual techniques to be initiated when appropriate    Assessment:       Assessment: Patient reports being pain free of his neck, upper back and lower back. He feels that he is stronger. He also reports that he will continue with his exercises at home.   He has also changed jobs to one that is less taxing on his back and neck  Treatment Diagnosis: Cervical and low back pain with soft tissue tightness in the thoracic regions  Prognosis: Good  Patient Education: Given black theraband to continue with his strengthening exercises at home. Goals:  Short term goals  Time Frame for Short term goals: 3-5 treatments  Short term goal 1: Patient will have improved posture requiring less than 2 verbal  cues per session (met 2/7/22)  Short term goal 2: No further scapular or upper back soft tissue tightness (met 2/7/22)  Short term goal 3: Pain of the cervical area will be decreased to 2/20 (met 2/7/22)    Long term goals  Time Frame for Long term goals : 5-10 treatments  Long term goal 1: Pain of the cervical area decreased to 1/10 to 0/10 (goal met 2/24/2022)  Long term goal 2: Pain of the low back area will be decreased to 1/10 to 0/10 (goal met 2/24/22)  Long term goal 3: Patient will demonstrate good upper body posture with no verbal cues (goal met 2/24/22)  Long term goal 4:  strength of the left will improve by at least 10 points per position and more comparable to norms (goal met 2/24/22)  Long term goal 5: Patient will be independent with home exercise program (goal met 2/24/22)  Progress toward goals: All goals met    POST-PAIN       Pain Rating (0-10 pain scale):  0 /10   Location and pain description same as pre-treatment unless indicated. Action: [] NA   [x] Perform HEP  [] Meds as prescribed  [] Modalities as prescribed   [] Call Physician     Frequency/Duration:  Plan  Plan Comment: Discharge from program due to goal attainment. Pt to continue current HEP. See objective section for any therapeutic exercise changes, additions or modifications this date.          PT Individual Minutes  Time In: 1120  Time Out: 1140  Minutes: 20  Timed Code Treatment Minutes: 0 Minutes  Procedure Minutes:20 minutes re-eval     Timed Activity Minutes Units   Ther Ex     Manual          Signature:  Electronically signed by Phil Davila PT on 2/24/22 at 11:52 AM EST

## 2022-04-09 ENCOUNTER — OFFICE VISIT (OUTPATIENT)
Dept: FAMILY MEDICINE CLINIC | Age: 22
End: 2022-04-09
Payer: COMMERCIAL

## 2022-04-09 VITALS
BODY MASS INDEX: 28.75 KG/M2 | WEIGHT: 183.2 LBS | TEMPERATURE: 96.1 F | HEART RATE: 85 BPM | OXYGEN SATURATION: 98 % | HEIGHT: 67 IN | RESPIRATION RATE: 16 BRPM | SYSTOLIC BLOOD PRESSURE: 104 MMHG | DIASTOLIC BLOOD PRESSURE: 68 MMHG

## 2022-04-09 DIAGNOSIS — Z20.822 COVID-19 RULED OUT: Primary | ICD-10-CM

## 2022-04-09 DIAGNOSIS — R11.0 NAUSEA: ICD-10-CM

## 2022-04-09 PROCEDURE — 87426 SARSCOV CORONAVIRUS AG IA: CPT | Performed by: NURSE PRACTITIONER

## 2022-04-09 PROCEDURE — 87804 INFLUENZA ASSAY W/OPTIC: CPT | Performed by: NURSE PRACTITIONER

## 2022-04-09 PROCEDURE — 99214 OFFICE O/P EST MOD 30 MIN: CPT | Performed by: NURSE PRACTITIONER

## 2022-04-09 PROCEDURE — G8427 DOCREV CUR MEDS BY ELIG CLIN: HCPCS | Performed by: NURSE PRACTITIONER

## 2022-04-09 PROCEDURE — 4004F PT TOBACCO SCREEN RCVD TLK: CPT | Performed by: NURSE PRACTITIONER

## 2022-04-09 PROCEDURE — G8417 CALC BMI ABV UP PARAM F/U: HCPCS | Performed by: NURSE PRACTITIONER

## 2022-04-09 RX ORDER — ONDANSETRON 4 MG/1
4 TABLET, ORALLY DISINTEGRATING ORAL EVERY 8 HOURS PRN
Qty: 15 TABLET | Refills: 0 | Status: SHIPPED | OUTPATIENT
Start: 2022-04-09 | End: 2022-04-14

## 2022-04-09 SDOH — ECONOMIC STABILITY: FOOD INSECURITY: WITHIN THE PAST 12 MONTHS, THE FOOD YOU BOUGHT JUST DIDN'T LAST AND YOU DIDN'T HAVE MONEY TO GET MORE.: NEVER TRUE

## 2022-04-09 SDOH — ECONOMIC STABILITY: FOOD INSECURITY: WITHIN THE PAST 12 MONTHS, YOU WORRIED THAT YOUR FOOD WOULD RUN OUT BEFORE YOU GOT MONEY TO BUY MORE.: NEVER TRUE

## 2022-04-09 ASSESSMENT — PATIENT HEALTH QUESTIONNAIRE - PHQ9
SUM OF ALL RESPONSES TO PHQ QUESTIONS 1-9: 0
1. LITTLE INTEREST OR PLEASURE IN DOING THINGS: 0
2. FEELING DOWN, DEPRESSED OR HOPELESS: 0
SUM OF ALL RESPONSES TO PHQ QUESTIONS 1-9: 0
SUM OF ALL RESPONSES TO PHQ9 QUESTIONS 1 & 2: 0

## 2022-04-09 ASSESSMENT — ENCOUNTER SYMPTOMS
TROUBLE SWALLOWING: 0
APNEA: 0
SHORTNESS OF BREATH: 0
EYE REDNESS: 0
SWOLLEN GLANDS: 0
ABDOMINAL PAIN: 0
CONSTIPATION: 0
WHEEZING: 0
EYE ITCHING: 0
VOMITING: 0
ABDOMINAL DISTENTION: 0
NAUSEA: 1
SINUS PAIN: 0
SORE THROAT: 0
COUGH: 0
CHEST TIGHTNESS: 0

## 2022-04-09 ASSESSMENT — SOCIAL DETERMINANTS OF HEALTH (SDOH): HOW HARD IS IT FOR YOU TO PAY FOR THE VERY BASICS LIKE FOOD, HOUSING, MEDICAL CARE, AND HEATING?: NOT HARD AT ALL

## 2022-04-09 NOTE — PROGRESS NOTES
Subjective:      Patient ID: Reva Monsalve is a 25 y.o. male who presents today for:  Chief Complaint   Patient presents with    Nausea     nausea x2 days, no vomitting, no fever. Pt reports he is not vaccinated. Pt declines ever having Covid. Pt agreed to completing a Flu and covid test today. Nausea & Vomiting  This is a new (pt declinjes any fevers, no vomitting) problem. The current episode started yesterday. The problem occurs intermittently. The problem has been waxing and waning. Associated symptoms include nausea. Pertinent negatives include no abdominal pain, arthralgias, chest pain, chills, congestion, coughing, fever, headaches, myalgias, rash, sore throat, swollen glands, vomiting or weakness. Nothing aggravates the symptoms. Pt reports he is not nauseous after eating but reports when he is sitting it\"just comes on sometimes\". Pt reports he is a vegetarian and eats a lot of chili, no red sauce noted. Pt reports \"he does not eat red sce or eat acidic foods either\". Pt declines any issues of GERD.     Past Medical History:   Diagnosis Date    Depression      Past Surgical History:   Procedure Laterality Date    CIRCUMCISION       Social History     Socioeconomic History    Marital status: Single     Spouse name: Not on file    Number of children: Not on file    Years of education: Not on file    Highest education level: Not on file   Occupational History    Not on file   Tobacco Use    Smoking status: Current Every Day Smoker    Smokeless tobacco: Never Used   Vaping Use    Vaping Use: Some days    Substances: Always (25 mg)   Substance and Sexual Activity    Alcohol use: Never    Drug use: Never    Sexual activity: Not on file   Other Topics Concern    Not on file   Social History Narrative    Not on file     Social Determinants of Health     Financial Resource Strain: Low Risk     Difficulty of Paying Living Expenses: Not hard at all   Food Insecurity: No Food Insecurity    Worried About Running Out of Food in the Last Year: Never true    Praneeth of Food in the Last Year: Never true   Transportation Needs:     Lack of Transportation (Medical): Not on file    Lack of Transportation (Non-Medical): Not on file   Physical Activity:     Days of Exercise per Week: Not on file    Minutes of Exercise per Session: Not on file   Stress:     Feeling of Stress : Not on file   Social Connections:     Frequency of Communication with Friends and Family: Not on file    Frequency of Social Gatherings with Friends and Family: Not on file    Attends Religion Services: Not on file    Active Member of [address] [place] [address] [employer] or Organizations: Not on file    Attends Club or Organization Meetings: Not on file    Marital Status: Not on file   Intimate Partner Violence:     Fear of Current or Ex-Partner: Not on file    Emotionally Abused: Not on file    Physically Abused: Not on file    Sexually Abused: Not on file   Housing Stability:     Unable to Pay for Housing in the Last Year: Not on file    Number of [place] in the Last Year: Not on file    Unstable Housing in the Last Year: Not on file     Family History   Problem Relation Age of Onset    Depression Mother     Diabetes Mother     Diabetes Maternal Aunt     Heart Attack Maternal Uncle     Heart Disease Maternal Uncle     Heart Disease Other         mom's side    Stroke Other         mom's side    Breast Cancer Neg Hx     Colon Cancer Neg Hx     Prostate Cancer Neg Hx     Cancer Neg Hx     High Blood Pressure Neg Hx     High Cholesterol Neg Hx      No Known Allergies      Review of Systems   Constitutional: Negative for activity change, appetite change, chills and fever. HENT: Negative for congestion, drooling, ear pain, hearing loss, postnasal drip, sinus pain, sore throat and trouble swallowing. Eyes: Negative for redness, itching and visual disturbance.    Respiratory: Negative for apnea, cough, chest tightness, shortness of breath and wheezing. Cardiovascular: Negative for chest pain and palpitations. Gastrointestinal: Positive for nausea. Negative for abdominal distention, abdominal pain, constipation and vomiting. Endocrine: Negative for heat intolerance. Genitourinary: Negative for difficulty urinating, flank pain and genital sores. Musculoskeletal: Negative for arthralgias, gait problem, myalgias and neck stiffness. Skin: Negative for rash. Neurological: Negative for tremors, seizures, weakness and headaches. Hematological: Negative for adenopathy. Psychiatric/Behavioral: Negative for confusion. All other systems reviewed and are negative. Objective:   /68 (Site: Left Upper Arm, Position: Sitting, Cuff Size: Medium Adult)   Pulse 85   Temp 96.1 °F (35.6 °C) (Temporal)   Resp 16   Ht 5' 7\" (1.702 m)   Wt 183 lb 3.2 oz (83.1 kg)   SpO2 98%   BMI 28.69 kg/m²     Physical Exam  Vitals and nursing note reviewed. Constitutional:       General: He is awake. He is not in acute distress. Appearance: Normal appearance. He is well-developed, well-groomed and normal weight. He is not ill-appearing, toxic-appearing or diaphoretic. HENT:      Head: Normocephalic and atraumatic. Right Ear: Tympanic membrane normal.      Left Ear: Tympanic membrane normal.      Nose: Nose normal.      Mouth/Throat:      Mouth: Mucous membranes are moist.      Pharynx: No posterior oropharyngeal erythema. Eyes:      Conjunctiva/sclera: Conjunctivae normal.      Pupils: Pupils are equal, round, and reactive to light. Cardiovascular:      Rate and Rhythm: Normal rate and regular rhythm. Pulses: Normal pulses. Heart sounds: Normal heart sounds. No murmur heard. Pulmonary:      Effort: Pulmonary effort is normal. No tachypnea, bradypnea or respiratory distress. Breath sounds: Normal breath sounds and air entry. No stridor or transmitted upper airway sounds.  No decreased breath sounds, wheezing or rhonchi. Chest:      Chest wall: No tenderness. Abdominal:      General: Bowel sounds are normal. There is no distension. Palpations: Abdomen is soft. Tenderness: There is no abdominal tenderness. There is no right CVA tenderness, left CVA tenderness or guarding. Musculoskeletal:         General: No signs of injury. Normal range of motion. Cervical back: Normal range of motion. Lymphadenopathy:      Cervical: No cervical adenopathy. Skin:     General: Skin is warm and dry. Capillary Refill: Capillary refill takes less than 2 seconds. Findings: No erythema or rash. Neurological:      General: No focal deficit present. Mental Status: He is alert and oriented to person, place, and time. Mental status is at baseline. Motor: No weakness. Psychiatric:         Attention and Perception: Attention and perception normal.         Mood and Affect: Mood and affect normal.         Speech: Speech normal.         Behavior: Behavior normal. Behavior is cooperative. Thought Content: Thought content normal.         Judgment: Judgment normal.         Assessment:       Diagnosis Orders   1. COVID-19 ruled out  POCT COVID-19, Antigen    POCT Influenza A/B   2. Nausea  POCT COVID-19, Antigen    POCT Influenza A/B    ondansetron (ZOFRAN ODT) 4 MG disintegrating tablet         Plan:      Orders Placed This Encounter   Procedures    POCT COVID-19, Antigen     Order Specific Question:   Is this test for diagnosis or screening? Answer:   Diagnosis of ill patient     Order Specific Question:   Symptomatic for COVID-19 as defined by CDC? Answer:   Yes     Order Specific Question:   Date of Symptom Onset     Answer:   4/8/2022     Order Specific Question:   Hospitalized for COVID-19? Answer:   No     Order Specific Question:   Admitted to ICU for COVID-19? Answer:   No     Order Specific Question:   Employed in healthcare setting? Answer:    No Order Specific Question:   Resident in a congregate (group) care setting? Answer:   No     Order Specific Question:   Pregnant: Answer:   No     Order Specific Question:   Previously tested for COVID-19? Answer: Yes    POCT Influenza A/B     Orders Placed This Encounter   Medications    ondansetron (ZOFRAN ODT) 4 MG disintegrating tablet     Sig: Take 1 tablet by mouth every 8 hours as needed for Nausea or Vomiting     Dispense:  15 tablet     Refill:  0   No results found for this visit on 04/09/22. Pt here today with c/o \"feeling nauseous but declines any fevers, vomiting or recent illnesses. Pt agreed to completing Covid and flu test today. Pt declines any other s/s at this time and reports he started to feel this way yesterday and left work and called off today. Pt shows no distress today. Pt requested a work excuse for today. Pt declines distress and advised of the red flag s/s that would warrant a trip to ER such as SOB, chest pain, high fevers, drooling, trouble breathing. Pt also advised how to take the zofran as needed and if his s/s persist to follow up with his PCP. Pt verbalized understanding of the [facility] today. Pt advised in office today all tests completed were WNL/neg. Pt aware. Discussed signs and symptoms which require immediate follow-up in ED/call to 911. Patient verbalized understanding. Pt left the [facility] today in stable condition. Discussed signs and symptoms which require immediate follow-up in ED/call to 911. Patient verbalized understanding. Return if symptoms worsen or fail to improve. Reviewed with the patient: current clinical status, medications, activities and diet. Side effects, adverse effects of the medication prescribed today, as well as treatment plan and result expectations have been discussed with the patient who expresses understanding and desires to proceed. Close follow up to evaluate treatment results and for coordination of care.   I have reviewed the patient's medical history in detail and updated the computerized patient record.       VINNY Morris CNP

## 2022-04-09 NOTE — PATIENT INSTRUCTIONS
Patient Education        Learning About Coronavirus [phone])  What is coronavirus (COVID-19)? COVID-19 is a disease caused by a type of coronavirus. This illness was firstfound in December 2019. It has since spread worldwide. Coronaviruses are a large group of viruses. They cause the common cold. They also cause more serious illnesses like Middle East respiratory syndrome (MERS) and severe acute respiratory syndrome (SARS). COVID-19 is caused by a novelcoronavirus. That means it's a new type that has not been seen in people before. What are the symptoms? COVID-19 symptoms may include:   Fever.  Cough.  Trouble breathing.  Chills or repeated shaking with chills.  Muscle and body aches.  Headache.  Sore throat.  New loss of taste or smell.  Vomiting.  Diarrhea. In severe cases, COVID-19 can cause pneumonia and make it hard to breathewithout help from a machine. It can cause death. How is it diagnosed? COVID-19 is diagnosed with a viral test. This may also be called a PCR test or antigen test. It looks for evidence of the virus in your breathing passages orlungs (respiratory system). The test is most often done on a sample from the nose, throat, or lungs. It's sometimes done on a sample of saliva. One way a sample is collected is byputting a long swab into the back of your nose. If you have questions about COVID-19 testing, ask your doctor or go to cdc.govto use the COVID-19 Viral Testing Tool. How is it treated? Mild cases of COVID-19 can be treated at home. Serious cases need treatment in the hospital. Treatment may include medicines to reduce symptoms, plus breathing support such as oxygen therapy or a ventilator. Some people may beplaced on their belly to help their oxygen levels. Treatments that may help people who have COVID-19 include:  Antiviral medicines. These medicines treat viral infections. Immune-based therapy. These medicines help the immune system fight COVID-19. Examples include monoclonal antibodies. Blood thinners. These medicines help prevent blood clots. People with severe illness are at risk for blood clots. How can you protect yourself and others?  Get vaccinated and boosted.  Avoid sick people and stay away from others if you are sick.  Stay at least 6 feet away from other people.  Avoid crowds, especially inside.  Get tested for COVID-19 before you have an indoor visit with people that don't live with you.  Cover your mouth with a tissue when you cough or sneeze.  Wash your hands often, especially after you cough or sneeze. Use soap and water, and scrub for at least 20 seconds. If soap and water aren't available, use an alcohol-based hand .  Avoid touching your mouth, nose, and eyes. Be sure to follow all instructions from the [facility] and your local health authorities. Here are some examples of specific precautions you may need totake.  If you are not fully vaccinated and boosted:  ? Wear a mask if you have to go to public areas.  Even if you're fully vaccinated and boosted, there's still a chance you can get and spread COVID-19. If you live in an area where COVID-19 is spreading quickly, wear a mask if you have to go to indoor public areas. You might also want to wear a mask in crowded outdoor areas as well as public indoor spaces if you:  ? Have certain health conditions. ? Live with someone who has a compromised immune system. ? Live with someone who is not fully vaccinated.  If you have been exposed to the virus and are not fully vaccinated and boosted:  ? Talk to your doctor as soon as you can. Your doctor might have you take medicine to help prevent serious illness. ? Get a COVID-19 test. You may need to be tested more than once. ? Stay home. Try to separate from other people where you live. Don't go to school, work, or public areas. ? Wear a mask around other people for a full 10 days.  Avoid travel and stay away from people sneeze. Use soap and water, and scrub for at least 20 seconds. If soap and water aren't available, use an alcohol-based hand .  Don't share personal household items. These include bedding, towels, cups and glasses, and eating utensils. [address] in the warmest water allowed for the fabric type, and dry it completely. It's okay to wash other people's laundry with yours.  Clean and disinfect your home. Use household  and disinfectant wipes or sprays. When should you call for help? Call 911 anytime you think you may need emergency care. For example, call if you have life-threatening symptoms, such as:     You have severe trouble breathing. (You can't talk at all.)      You have constant chest pain or pressure.      You are severely dizzy or lightheaded.      You are confused or can't think clearly.      You have pale, gray, or blue-colored skin or lips.      You pass out (lose consciousness) or are very hard to wake up.      You have loss of balance or trouble walking.      You have trouble seeing out of one or both eyes.      You have weakness or drooping on one side of the face.      You have weakness or numbness in an arm or a leg.      You have trouble speaking.      You have a severe headache.      You have a seizure. Call your doctor now or seek immediate medical care if:     You have moderate trouble breathing. (You can't speak a full sentence.)      You are coughing up blood.      You have signs of low blood pressure. These include feeling lightheaded; being too weak to stand; and having cold, pale, clammy skin. Watch closely for changes in your health, and be sure to contact your doctor if:     Your symptoms get worse.      You are not getting better as expected.      You have new or worse symptoms of anxiety, depression, nightmares, or flashbacks. Call before you go to the doctor's office. Follow their instructions. And wear a mask.   Where can you learn more?  Go to [email or web address] and sign in to your [employer] account. Enter C008 in the [facility] box to learn more about \"Learning About Coronavirus (COVID-19). \"     If you do not have an account, please click on the \"Sign Up Now\" link. Current as of: July 1, 2021               Content Version: 13.2  © 2006-2022 [employer]. Care instructions adapted under license by [facility] ([facility]). If you have questions about a medical condition or this instruction, always ask your healthcare professional. [address] any warranty or liability for your use of this information. Patient Education        COVID-19 Viral Test: About This Test  What is it? A COVID-19 viral test is a way to find out if you have COVID-19. The test looks for the virus in your breathing passages. There are different types of viraltests. One type looks for genetic material from the virus. This is usually calledpolymerase chain reaction (PCR). Another type looks for proteins on the virus. This is usually called an antigentest. It may not be as accurate as PCR. Some test results come back in a few minutes. Others may take a few days. If you have questions about COVID-19 testing, ask your doctor or go to cdc.govto use the COVID-19 Viral Testing Tool. Why is it done? This test is used to diagnose a current infection with SARS-CoV-2, the virus that causes COVID-19. Knowing that you have the virus means that you can take steps to protect others from getting infected. This can help limit the spreadof the virus. If you have the virus, your doctor might have you take medicine to help preventserious illness. Knowing who has COVID-19 is also important for experts who track the virus. How do you prepare for the test?  You don't need to do anything to prepare for this test. But be sure to followany instructions your health care provider gives you. How is it done?   The test is most often done on a sample from your nose or throat. It's sometimes done on a sample of saliva. One way a sample is collected is by putting a long swab into the back of your nose. Samples can be tested indifferent ways to look for an infection. What should you do while you wait for your test results? What to do while you wait for your results depends on why you got the test.   If you have COVID-19 symptoms : Wear a mask around other people, avoid travel and stay away from people who are at high risk of serious COVID illness for at least 10 days. Also, stay in the place where you live, and separate yourself from others.  If you were exposed to someone with COVID-19 AND you don't have symptoms AND you are fully vaccinated and boosted or you tested positive for the COVID virus in the last 90 days and have recovered: Wear a mask around other people, avoid travel, and stay away from people who are at high risk of serious COVID illness for at least 10 days.  If you were exposed to someone with COVID-19 AND are NOT fully vaccinated and boosted AND have NOT tested positive for and recovered from the COVID virus in the last 90 days: Wear a mask around other people, avoid travel and stay away from people who are at high risk of serious COVID illness for at least 10 days. Also, stay in the place where you live, and separate yourself from others.  If you are currently self-isolating because you have COVID-19 OR if you were exposed and are testing to see if you can leave self-isolation before 10 days: Wear a mask around other people, avoid travel, and stay away from people who are at high risk of serious COVID illness for at least 10 days. Also, stay in the place where you live, and separate yourself from others.    If you are testing for a reason like work, school, or travel, or before visiting indoors with people you don't live with: You don't need to do anything special. Follow the cdc.gov and local health guidelines about things like wearing a mask and physical distancing. What do your results mean? The result is either positive or negative. A positive result means that the antigen or the genetic material of the viruswas found in your sample. You have COVID-19 now. A negative result means that the antigen or the genetic material was not found. This may mean that you don't have COVID-19. But it's possible to get a \"false-negative\" result. This means that the test shows that you don't have COVID-19 when in fact you do. This may happen because you were tested too soon after you were infected, before the virus started to spread in your nose and throat. Or it could happenbecause the swab missed the infection. If you get a negative result for an antigen test, your doctor may recommend that you get another test, such as polymerase chain reaction (PCR), to [facility] you don't have the virus. Some test results come back in a few minutes. Others may take a few days. If your test is negative, follow your doctor's advice for when you can go back to activities. If your test is positive, talk to your doctor or a public healthofficial about what you need to do. Where can you learn more? Go to [email or web address] [email or web address] [email or web address] and sign in to your [employer] account. Enter A129 in the [employer] box to learn more about \"COVID-19 Viral Test: About This Test.\"     If you do not have an account, please click on the \"Sign Up Now\" link. Current as of: March 26, 2021               Content Version: 13.2  © 2006-2022 Healthwise, [employer] Care instructions adapted under license by [facility] ([facility]). If you have questions about a medical condition or this instruction, always ask your healthcare professional. [place] [address] any warranty or liability for your use of this information.          Patient Education        Nausea and Vomiting: Care Instructions  Overview     When you are nauseated, you may feel weak and sweaty and notice a lot of saliva in your mouth. Nausea often leads to vomiting. Most of the time you do not needto worry about nausea and vomiting, but they can be signs of other illnesses. Two common causes of nausea and vomiting are a stomach infection and food poisoning. Nausea and vomiting from a viral stomach infection will usually start to improve within 24 hours. Nausea and vomiting from food poisoning maylast from 12 to 48 hours. The doctor has checked you carefully, but problems can develop later. If you notice any problems or new symptoms, get medical treatment right away. Follow-up care is a key part of your treatment and safety. Be sure to make and go to all appointments, and call your doctor if you are having problems. It's also a good idea to know your test results and keep alist of the medicines you take. How can you care for yourself at home?  To prevent dehydration, drink plenty of fluids. Choose water and other clear liquids until you feel better. If you have kidney, heart, or liver disease and have to limit fluids, talk with your doctor before you increase the amount of fluids you drink.  Rest in bed until you feel better.  When you are able to eat, try clear soups, mild foods, and liquids until all symptoms are gone for 12 to 48 hours. Other good choices include dry toast, crackers, cooked cereal, and gelatin dessert, such as Jell-O. When should you call for help? Call 911 anytime you think you may need emergency care. For example, call if:     You passed out (lost consciousness). Call your doctor now or seek immediate medical care if:     You have symptoms of dehydration, such as:  ? Dry eyes and a dry mouth. ? Passing only a little urine. ? Feeling thirstier than usual.      You have new or worsening belly pain.      You have a new or higher fever.      You vomit blood or what looks like coffee grounds.    Watch closely for changes in your health, and be sure to contact your doctor if:     You have ongoing nausea and vomiting.      Your vomiting is getting worse.      Your vomiting lasts longer than 2 days.      You are not getting better as expected. Where can you learn more? Go to [email or web address] and sign in to your [employer] account. Enter [id number] in the [facility] box to learn more about \"Nausea and Vomiting: Care Instructions. \"     If you do not have an account, please click on the \"Sign Up Now\" link. Current as of: July 1, 2021               Content Version: 13.2  © 0576-3601 Healthwise, Incorporated. Care instructions adapted under license by [facility] ([facility]). If you have questions about a medical condition or this instruction, always ask your healthcare professional. [address] any warranty or liability for your use of this information. Discussed signs and symptoms which require immediate follow-up in ED/call to 911. Patient verbalized understanding.

## 2022-11-29 ENCOUNTER — OFFICE VISIT (OUTPATIENT)
Dept: FAMILY MEDICINE CLINIC | Age: 22
End: 2022-11-29
Payer: COMMERCIAL

## 2022-11-29 VITALS
TEMPERATURE: 97.1 F | WEIGHT: 162.8 LBS | HEIGHT: 67 IN | OXYGEN SATURATION: 99 % | SYSTOLIC BLOOD PRESSURE: 110 MMHG | HEART RATE: 67 BPM | DIASTOLIC BLOOD PRESSURE: 60 MMHG | BODY MASS INDEX: 25.55 KG/M2

## 2022-11-29 DIAGNOSIS — Z00.00 WELL ADULT EXAM: Primary | ICD-10-CM

## 2022-11-29 PROCEDURE — G8484 FLU IMMUNIZE NO ADMIN: HCPCS | Performed by: NURSE PRACTITIONER

## 2022-11-29 PROCEDURE — 99395 PREV VISIT EST AGE 18-39: CPT | Performed by: NURSE PRACTITIONER

## 2022-11-29 ASSESSMENT — ENCOUNTER SYMPTOMS
CONSTIPATION: 0
DIARRHEA: 0
SHORTNESS OF BREATH: 0
COUGH: 0

## 2022-11-29 NOTE — PROGRESS NOTES
Subjective  Chief Complaint   Patient presents with    Annual Exam     Needs physical for [employer]     Flu Vaccine     declined       HPI    Pt is here for a general check up. Planning on joining [employer]. Should start around January. Some elbow pain over the past week. Seems to be getting better. Breathing is normal.   Has slowed down on vaping. No chest pain. No syncope. Remote hx of depression when child. No recent medications or treatments needed. There are no problems to display for this patient. Past Medical History:   Diagnosis Date    Depression      Past Surgical History:   Procedure Laterality Date    CIRCUMCISION       Family History   Problem Relation Age of Onset    Depression Mother     Diabetes Mother     Diabetes Maternal Aunt     Heart Attack Maternal Uncle     Heart Disease Maternal Uncle     Heart Disease Other         mom's side    Stroke Other         mom's side    Breast Cancer Neg Hx     Colon Cancer Neg Hx     Prostate Cancer Neg Hx     Cancer Neg Hx     High Blood Pressure Neg Hx     High Cholesterol Neg Hx      Social History     Socioeconomic History    Marital status: Single     Spouse name: None    Number of children: None    Years of education: None    Highest education level: None   Tobacco Use    Smoking status: Every Day    Smokeless tobacco: Never   Vaping Use    Vaping Use: Some days    Substances: Always (25 mg)   Substance and Sexual Activity    Alcohol use: Never    Drug use: Never     Social Determinants of Health     Financial Resource Strain: Low Risk     Difficulty of Paying Living Expenses: Not hard at all   Food Insecurity: No Food Insecurity    Worried About Running Out of Food in the Last Year: Never true    Ran Out of Food in the Last Year: Never true     No current outpatient medications on file prior to visit. No current facility-administered medications on file prior to visit.      No Known Allergies    Review of Systems Constitutional:  Negative for fatigue. Respiratory:  Negative for cough and shortness of breath. Cardiovascular:  Negative for chest pain. Gastrointestinal:  Negative for constipation and diarrhea. Objective  Vitals:    11/29/22 1631   BP: 110/60   Pulse: 67   Temp: 97.1 °F (36.2 °C)   SpO2: 99%   Weight: 162 lb 12.8 oz (73.8 kg)   Height: 5' 7\" (1.702 m)     Physical Exam  Vitals and nursing note reviewed. Constitutional:       Appearance: Normal appearance. He is normal weight. HENT:      Head: Normocephalic. Nose: Nose normal.      Mouth/Throat:      Mouth: Mucous membranes are moist.      Pharynx: Oropharynx is clear. Eyes:      Extraocular Movements: Extraocular movements intact. Conjunctiva/sclera: Conjunctivae normal.      Pupils: Pupils are equal, round, and reactive to light. Cardiovascular:      Rate and Rhythm: Normal rate and regular rhythm. Pulses: Normal pulses. Heart sounds: Normal heart sounds. Musculoskeletal:      Cervical back: Neck supple. Skin:     General: Skin is warm. Neurological:      General: No focal deficit present. Mental Status: He is alert and oriented to person, place, and time. Mental status is at baseline. Psychiatric:         Mood and Affect: Mood normal.         Behavior: Behavior normal.         Thought Content: Thought content normal.         Judgment: Judgment normal.       Assessment & Plan     Diagnosis Orders   1. 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 Side effects, adverse effects of the medication prescribed today, as well as treatment plan/ rationale and result expectations have been discussed with the patient who expresses understanding and desires to proceed. Close follow up to evaluate treatment results and for coordination of care. I have reviewed the patient's medical history in detail and updated the computerized patient record.     As always, patient is advised that if symptoms worsen in any way they will proceed to the nearest emergency room.           Javy Riley, VINNY - CNP

## 2023-03-22 ENCOUNTER — OFFICE VISIT (OUTPATIENT)
Dept: FAMILY MEDICINE CLINIC | Age: 23
End: 2023-03-22
Payer: COMMERCIAL

## 2023-03-22 VITALS
OXYGEN SATURATION: 98 % | BODY MASS INDEX: 29.82 KG/M2 | SYSTOLIC BLOOD PRESSURE: 112 MMHG | DIASTOLIC BLOOD PRESSURE: 80 MMHG | HEART RATE: 112 BPM | WEIGHT: 190 LBS | HEIGHT: 67 IN

## 2023-03-22 DIAGNOSIS — R41.840 INATTENTION: Primary | ICD-10-CM

## 2023-03-22 PROCEDURE — G8484 FLU IMMUNIZE NO ADMIN: HCPCS | Performed by: NURSE PRACTITIONER

## 2023-03-22 PROCEDURE — 4004F PT TOBACCO SCREEN RCVD TLK: CPT | Performed by: NURSE PRACTITIONER

## 2023-03-22 PROCEDURE — 99213 OFFICE O/P EST LOW 20 MIN: CPT | Performed by: NURSE PRACTITIONER

## 2023-03-22 PROCEDURE — G8417 CALC BMI ABV UP PARAM F/U: HCPCS | Performed by: NURSE PRACTITIONER

## 2023-03-22 PROCEDURE — G8427 DOCREV CUR MEDS BY ELIG CLIN: HCPCS | Performed by: NURSE PRACTITIONER

## 2023-03-22 SDOH — ECONOMIC STABILITY: INCOME INSECURITY: HOW HARD IS IT FOR YOU TO PAY FOR THE VERY BASICS LIKE FOOD, HOUSING, MEDICAL CARE, AND HEATING?: NOT HARD AT ALL

## 2023-03-22 SDOH — ECONOMIC STABILITY: HOUSING INSECURITY
IN THE LAST 12 MONTHS, WAS THERE A TIME WHEN YOU DID NOT HAVE A STEADY PLACE TO SLEEP OR SLEPT IN A SHELTER (INCLUDING NOW)?: NO

## 2023-03-22 SDOH — ECONOMIC STABILITY: FOOD INSECURITY: WITHIN THE PAST 12 MONTHS, THE FOOD YOU BOUGHT JUST DIDN'T LAST AND YOU DIDN'T HAVE MONEY TO GET MORE.: NEVER TRUE

## 2023-03-22 SDOH — ECONOMIC STABILITY: FOOD INSECURITY: WITHIN THE PAST 12 MONTHS, YOU WORRIED THAT YOUR FOOD WOULD RUN OUT BEFORE YOU GOT MONEY TO BUY MORE.: NEVER TRUE

## 2023-03-22 ASSESSMENT — PATIENT HEALTH QUESTIONNAIRE - PHQ9
2. FEELING DOWN, DEPRESSED OR HOPELESS: 0
SUM OF ALL RESPONSES TO PHQ9 QUESTIONS 1 & 2: 0
1. LITTLE INTEREST OR PLEASURE IN DOING THINGS: 0
SUM OF ALL RESPONSES TO PHQ QUESTIONS 1-9: 0

## 2023-03-22 ASSESSMENT — ENCOUNTER SYMPTOMS
DIARRHEA: 0
SHORTNESS OF BREATH: 0
CONSTIPATION: 0
COUGH: 0

## 2023-03-22 NOTE — PROGRESS NOTES
Psychology     Number of Visits Requested:   1       Side effects, adverse effects of the medication prescribed today, as well as treatment plan/ rationale and result expectations have been discussed with the patient who expresses understanding and desires to proceed. Close follow up to evaluate treatment results and for coordination of care. I have reviewed the patient's medical history in detail and updated the computerized patient record. As always, patient is advised that if symptoms worsen in any way they will proceed to the nearest emergency room. Fu after eval.    There are no discontinued medications. No follow-ups on file.   Ivana Pedersen, APRN - CNP

## 2023-04-28 ENCOUNTER — TELEMEDICINE (OUTPATIENT)
Dept: BEHAVIORAL/MENTAL HEALTH CLINIC | Age: 23
End: 2023-04-28

## 2023-04-28 DIAGNOSIS — F41.1 GENERALIZED ANXIETY DISORDER: Primary | ICD-10-CM

## 2023-04-28 ASSESSMENT — PATIENT HEALTH QUESTIONNAIRE - PHQ9
1. LITTLE INTEREST OR PLEASURE IN DOING THINGS: 1
9. THOUGHTS THAT YOU WOULD BE BETTER OFF DEAD, OR OF HURTING YOURSELF: 0
SUM OF ALL RESPONSES TO PHQ QUESTIONS 1-9: 16
6. FEELING BAD ABOUT YOURSELF - OR THAT YOU ARE A FAILURE OR HAVE LET YOURSELF OR YOUR FAMILY DOWN: 2
SUM OF ALL RESPONSES TO PHQ QUESTIONS 1-9: 16
3. TROUBLE FALLING OR STAYING ASLEEP: 3
SUM OF ALL RESPONSES TO PHQ QUESTIONS 1-9: 16
8. MOVING OR SPEAKING SO SLOWLY THAT OTHER PEOPLE COULD HAVE NOTICED. OR THE OPPOSITE, BEING SO FIGETY OR RESTLESS THAT YOU HAVE BEEN MOVING AROUND A LOT MORE THAN USUAL: 1
5. POOR APPETITE OR OVEREATING: 3
4. FEELING TIRED OR HAVING LITTLE ENERGY: 3
SUM OF ALL RESPONSES TO PHQ QUESTIONS 1-9: 16
2. FEELING DOWN, DEPRESSED OR HOPELESS: 1
7. TROUBLE CONCENTRATING ON THINGS, SUCH AS READING THE NEWSPAPER OR WATCHING TELEVISION: 2
SUM OF ALL RESPONSES TO PHQ9 QUESTIONS 1 & 2: 2

## 2023-04-28 ASSESSMENT — ANXIETY QUESTIONNAIRES
1. FEELING NERVOUS, ANXIOUS, OR ON EDGE: 3
3. WORRYING TOO MUCH ABOUT DIFFERENT THINGS: 2
6. BECOMING EASILY ANNOYED OR IRRITABLE: 3
5. BEING SO RESTLESS THAT IT IS HARD TO SIT STILL: 2
GAD7 TOTAL SCORE: 16
2. NOT BEING ABLE TO STOP OR CONTROL WORRYING: 3
4. TROUBLE RELAXING: 2
7. FEELING AFRAID AS IF SOMETHING AWFUL MIGHT HAPPEN: 1

## 2023-05-10 ENCOUNTER — OFFICE VISIT (OUTPATIENT)
Dept: BEHAVIORAL/MENTAL HEALTH CLINIC | Age: 23
End: 2023-05-10
Payer: COMMERCIAL

## 2023-05-10 DIAGNOSIS — F41.1 GENERALIZED ANXIETY DISORDER: Primary | ICD-10-CM

## 2023-05-10 PROCEDURE — 4004F PT TOBACCO SCREEN RCVD TLK: CPT | Performed by: PSYCHOLOGIST

## 2023-05-10 PROCEDURE — 90834 PSYTX W PT 45 MINUTES: CPT | Performed by: PSYCHOLOGIST

## 2023-05-10 ASSESSMENT — PATIENT HEALTH QUESTIONNAIRE - PHQ9
5. POOR APPETITE OR OVEREATING: 3
1. LITTLE INTEREST OR PLEASURE IN DOING THINGS: 1
SUM OF ALL RESPONSES TO PHQ9 QUESTIONS 1 & 2: 2
8. MOVING OR SPEAKING SO SLOWLY THAT OTHER PEOPLE COULD HAVE NOTICED. OR THE OPPOSITE, BEING SO FIGETY OR RESTLESS THAT YOU HAVE BEEN MOVING AROUND A LOT MORE THAN USUAL: 1
4. FEELING TIRED OR HAVING LITTLE ENERGY: 3
7. TROUBLE CONCENTRATING ON THINGS, SUCH AS READING THE NEWSPAPER OR WATCHING TELEVISION: 3
SUM OF ALL RESPONSES TO PHQ QUESTIONS 1-9: 16
2. FEELING DOWN, DEPRESSED OR HOPELESS: 1
9. THOUGHTS THAT YOU WOULD BE BETTER OFF DEAD, OR OF HURTING YOURSELF: 0
SUM OF ALL RESPONSES TO PHQ QUESTIONS 1-9: 16
3. TROUBLE FALLING OR STAYING ASLEEP: 3
SUM OF ALL RESPONSES TO PHQ QUESTIONS 1-9: 16
SUM OF ALL RESPONSES TO PHQ QUESTIONS 1-9: 16
6. FEELING BAD ABOUT YOURSELF - OR THAT YOU ARE A FAILURE OR HAVE LET YOURSELF OR YOUR FAMILY DOWN: 1

## 2023-05-10 ASSESSMENT — ANXIETY QUESTIONNAIRES
6. BECOMING EASILY ANNOYED OR IRRITABLE: 3
7. FEELING AFRAID AS IF SOMETHING AWFUL MIGHT HAPPEN: 1
5. BEING SO RESTLESS THAT IT IS HARD TO SIT STILL: 2
GAD7 TOTAL SCORE: 15
3. WORRYING TOO MUCH ABOUT DIFFERENT THINGS: 2
4. TROUBLE RELAXING: 2
1. FEELING NERVOUS, ANXIOUS, OR ON EDGE: 3
2. NOT BEING ABLE TO STOP OR CONTROL WORRYING: 2

## 2023-05-10 NOTE — PROGRESS NOTES
Behavioral Health Consultation  Diane Elliott, [address]. Psychologist  5/10/23  8:50 AM EDT      Time spent with Patient: 40 minutes  This is patient's second  [facility] appointment. Reason for Consult:  attention concerns  Referring Provider: No referring provider defined for this encounter. Feedback given to PCP. S:  Presenting Concerns:  Pt reports that he was sent home yesterday for arriving 4 minutes late. Feels like he may get let go. 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 Completed following diagnostic interview. Pt responses are in bold    DSM 5 Diagnostic Interview  ADHD    A. Inattention inclusion:     Overlooks details: Over at least the last 6 months, have other people told you that you often overlook or miss details, or that you made careless mistakes in your work? No      Examples:  NA  Task inattention: Do you often have difficulty staying focused on a task or activity, such as reading a lengthy writing or listening to a lecture or conversation? Yes   Examples:  I definitely have trouble with this. Reading things, even if not super long, I just \"unfocus,\" I have to restart constantly until I get through it. When I was a kid I would fall asleep all the time during lectures because I could not engage even if I got enough sleep Now I can stay awake. Appears not to listen: Do other people tell you that when they speak to you, your mind often seems to be elsewhere or that it seems like you are not listening? Yeah   Examples:  Can you repeat the question? Well I guess I have heard this from significant others in the past a lot, less so now  Fails to finish tasks: Do you often struggle to finish schoolwork, chores, or work assignments because you lose focus or are easily sidetracked? yes   Examples:  When I was younger it was more of a prominent thing.  Now, I have less choice to get things done, but when I was younger I would jump from one thing to another,

## 2024-04-05 ENCOUNTER — OFFICE VISIT (OUTPATIENT)
Dept: FAMILY MEDICINE CLINIC | Age: 24
End: 2024-04-05

## 2024-04-05 VITALS
RESPIRATION RATE: 16 BRPM | TEMPERATURE: 97.6 F | BODY MASS INDEX: 23.54 KG/M2 | WEIGHT: 150 LBS | HEIGHT: 67 IN | HEART RATE: 78 BPM | OXYGEN SATURATION: 99 %

## 2024-04-05 DIAGNOSIS — A08.4 VIRAL GASTROENTERITIS: Primary | ICD-10-CM

## 2024-04-05 PROCEDURE — 99213 OFFICE O/P EST LOW 20 MIN: CPT

## 2024-04-05 RX ORDER — ONDANSETRON 4 MG/1
4 TABLET, FILM COATED ORAL 3 TIMES DAILY PRN
Qty: 9 TABLET | Refills: 0 | Status: SHIPPED | OUTPATIENT
Start: 2024-04-05 | End: 2024-04-08

## 2024-04-05 SDOH — ECONOMIC STABILITY: INCOME INSECURITY: HOW HARD IS IT FOR YOU TO PAY FOR THE VERY BASICS LIKE FOOD, HOUSING, MEDICAL CARE, AND HEATING?: NOT HARD AT ALL

## 2024-04-05 SDOH — ECONOMIC STABILITY: FOOD INSECURITY: WITHIN THE PAST 12 MONTHS, THE FOOD YOU BOUGHT JUST DIDN'T LAST AND YOU DIDN'T HAVE MONEY TO GET MORE.: NEVER TRUE

## 2024-04-05 SDOH — ECONOMIC STABILITY: FOOD INSECURITY: WITHIN THE PAST 12 MONTHS, YOU WORRIED THAT YOUR FOOD WOULD RUN OUT BEFORE YOU GOT MONEY TO BUY MORE.: NEVER TRUE

## 2024-04-05 ASSESSMENT — ENCOUNTER SYMPTOMS
DIARRHEA: 1
COLOR CHANGE: 0
NAUSEA: 1
VOMITING: 1

## 2024-04-05 ASSESSMENT — PATIENT HEALTH QUESTIONNAIRE - PHQ9
2. FEELING DOWN, DEPRESSED OR HOPELESS: NOT AT ALL
6. FEELING BAD ABOUT YOURSELF - OR THAT YOU ARE A FAILURE OR HAVE LET YOURSELF OR YOUR FAMILY DOWN: NOT AT ALL
SUM OF ALL RESPONSES TO PHQ QUESTIONS 1-9: 0
3. TROUBLE FALLING OR STAYING ASLEEP: NOT AT ALL
SUM OF ALL RESPONSES TO PHQ QUESTIONS 1-9: 0
9. THOUGHTS THAT YOU WOULD BE BETTER OFF DEAD, OR OF HURTING YOURSELF: NOT AT ALL
SUM OF ALL RESPONSES TO PHQ9 QUESTIONS 1 & 2: 0
5. POOR APPETITE OR OVEREATING: NOT AT ALL
SUM OF ALL RESPONSES TO PHQ QUESTIONS 1-9: 0
10. IF YOU CHECKED OFF ANY PROBLEMS, HOW DIFFICULT HAVE THESE PROBLEMS MADE IT FOR YOU TO DO YOUR WORK, TAKE CARE OF THINGS AT HOME, OR GET ALONG WITH OTHER PEOPLE: NOT DIFFICULT AT ALL
4. FEELING TIRED OR HAVING LITTLE ENERGY: NOT AT ALL
SUM OF ALL RESPONSES TO PHQ QUESTIONS 1-9: 0
7. TROUBLE CONCENTRATING ON THINGS, SUCH AS READING THE NEWSPAPER OR WATCHING TELEVISION: NOT AT ALL
1. LITTLE INTEREST OR PLEASURE IN DOING THINGS: NOT AT ALL

## 2024-04-05 NOTE — PROGRESS NOTES
[facility]  Walk-In Clinic Encounter    SUBJECTIVE    CHIEF COMPLAINT:   Chief Complaint   Patient presents with    GI Problem     Pt states he ate chicken x2 nights ago  and has had diarrhea and vomiting since        HPI:  Prieto Bhakta is a 23 y.o. male who presents as an established patient to the walk-in clinic today for:     GI Problem  The primary symptoms include nausea, vomiting and diarrhea. Primary symptoms do not include fever, fatigue, dysuria, myalgias, arthralgias or rash. The illness began 2 days ago. The onset was sudden.   The illness does not include chills.   Ate [employer] pre-cooked chicken 2 nights ago. Vomited once yesterday in AM. Diarrhea since yesterday, improving since. 2 episodes of diarrhea today. Mild abd discomfort. Felt feverish last night. States mostly here for work note. Has not tried anything at home for symptoms. Has been eating/drinking normal for him without issues.    Past Medical History:   Diagnosis Date    Depression        No current outpatient medications on file prior to visit.     No current facility-administered medications on file prior to visit.       No Known Allergies    Review of Systems   Constitutional:  Negative for chills, fatigue and fever.   Cardiovascular:  Negative for chest pain.   Gastrointestinal:  Positive for diarrhea, nausea and vomiting.   Genitourinary:  Negative for dysuria.   Musculoskeletal:  Negative for arthralgias and myalgias.   Skin:  Negative for color change, pallor and rash.       OBJECTIVE:  VITALS:  Pulse 78   Temp 97.6 °F (36.4 °C)   Resp 16   Ht 1.702 m (5' 7\")   Wt 68 kg (150 lb)   SpO2 99%   BMI 23.49 kg/m²     Physical Exam  Vitals reviewed.   Constitutional:       General: He is not in acute distress.     Appearance: Normal appearance. He is normal weight. He is not ill-appearing.   HENT:      Head: Normocephalic.   Cardiovascular:      Rate and Rhythm: Normal rate and regular rhythm.

## 2024-04-05 NOTE — PATIENT INSTRUCTIONS
- Over the counter anti-diarrheal (immodium) - only take if greater than 6 episodes/day  - increase fluids, soft bland foods  - Only take zofran if unable to keep any liquids down  - Follow-up with PCP in 1 week  - ER if worsening symptoms

## 2024-05-29 ENCOUNTER — OFFICE VISIT (OUTPATIENT)
Dept: FAMILY MEDICINE CLINIC | Age: 24
End: 2024-05-29
Payer: COMMERCIAL

## 2024-05-29 VITALS
BODY MASS INDEX: 24.78 KG/M2 | WEIGHT: 157.85 LBS | SYSTOLIC BLOOD PRESSURE: 110 MMHG | OXYGEN SATURATION: 98 % | HEART RATE: 80 BPM | TEMPERATURE: 98.6 F | HEIGHT: 67 IN | DIASTOLIC BLOOD PRESSURE: 80 MMHG

## 2024-05-29 DIAGNOSIS — R11.0 NAUSEA: ICD-10-CM

## 2024-05-29 DIAGNOSIS — R10.13 EPIGASTRIC PAIN: ICD-10-CM

## 2024-05-29 DIAGNOSIS — K21.9 GASTROESOPHAGEAL REFLUX DISEASE, UNSPECIFIED WHETHER ESOPHAGITIS PRESENT: Primary | ICD-10-CM

## 2024-05-29 PROCEDURE — G8427 DOCREV CUR MEDS BY ELIG CLIN: HCPCS | Performed by: NURSE PRACTITIONER

## 2024-05-29 PROCEDURE — 4004F PT TOBACCO SCREEN RCVD TLK: CPT | Performed by: NURSE PRACTITIONER

## 2024-05-29 PROCEDURE — 99213 OFFICE O/P EST LOW 20 MIN: CPT | Performed by: NURSE PRACTITIONER

## 2024-05-29 PROCEDURE — G8420 CALC BMI NORM PARAMETERS: HCPCS | Performed by: NURSE PRACTITIONER

## 2024-05-29 RX ORDER — OMEPRAZOLE 40 MG/1
40 CAPSULE, DELAYED RELEASE ORAL
Qty: 30 CAPSULE | Refills: 0 | Status: SHIPPED | OUTPATIENT
Start: 2024-05-29

## 2024-05-29 RX ORDER — ONDANSETRON 4 MG/1
4 TABLET, ORALLY DISINTEGRATING ORAL 3 TIMES DAILY PRN
Qty: 21 TABLET | Refills: 0 | Status: SHIPPED | OUTPATIENT
Start: 2024-05-29

## 2024-05-29 ASSESSMENT — ENCOUNTER SYMPTOMS
ABDOMINAL PAIN: 1
CONSTIPATION: 0
VOMITING: 0
ABDOMINAL DISTENTION: 0
BLOOD IN STOOL: 0
NAUSEA: 1
ANAL BLEEDING: 0
DIARRHEA: 0

## 2024-05-29 NOTE — PROGRESS NOTES
Subjective  Prieto Bhakta, 23 y.o. male presents today with:  Chief Complaint   Patient presents with    Abdominal Pain     States he has abd pain around the sternum, caused nausea but breathed through to prevent vomiting. States sx started this morning and pain was so bad he was on the floor.        HPI  Presents to  for epigastric pain   Symptoms began this morning   Experienced epigastric discomfort upon waking & getting out of bed   Nausea with the abdominal pain today   Denies emesis   Denies diarrhea   Denies constipation  Denies blood with BM nor blood with wiping   Able to eat and drink today   Sleep uninterrupted over night                 Past Medical History:   Diagnosis Date    Depression       Past Surgical History:   Procedure Laterality Date    CIRCUMCISION       Family History   Problem Relation Age of Onset    Depression Mother     Diabetes Mother     Diabetes Maternal Aunt     Heart Attack Maternal Uncle     Heart Disease Maternal Uncle     Heart Disease Other         mom's side    Stroke Other         mom's side    Breast Cancer Neg Hx     Colon Cancer Neg Hx     Prostate Cancer Neg Hx     Cancer Neg Hx     High Blood Pressure Neg Hx     High Cholesterol Neg Hx              Review of Systems   Constitutional:  Negative for activity change, appetite change, chills, fatigue and fever.   Gastrointestinal:  Positive for abdominal pain (resolved) and nausea. Negative for abdominal distention, anal bleeding, blood in stool, constipation, diarrhea and vomiting.   Genitourinary:  Negative for decreased urine volume and difficulty urinating.   Musculoskeletal:  Negative for myalgias and neck stiffness.   Neurological:  Negative for dizziness, weakness, light-headedness and headaches.   Psychiatric/Behavioral:  Negative for sleep disturbance.          PMH, Surgical Hx, Family Hx, and Social Hx reviewed and updated.  Health Maintenance reviewed.            Objective  Vitals:    05/29/24 1500

## 2024-06-28 ENCOUNTER — OFFICE VISIT (OUTPATIENT)
Dept: FAMILY MEDICINE CLINIC | Age: 24
End: 2024-06-28
Payer: COMMERCIAL

## 2024-06-28 VITALS
SYSTOLIC BLOOD PRESSURE: 110 MMHG | DIASTOLIC BLOOD PRESSURE: 82 MMHG | OXYGEN SATURATION: 93 % | BODY MASS INDEX: 26.4 KG/M2 | WEIGHT: 168.2 LBS | HEIGHT: 67 IN | HEART RATE: 73 BPM

## 2024-06-28 DIAGNOSIS — R55 SYNCOPE, UNSPECIFIED SYNCOPE TYPE: Primary | ICD-10-CM

## 2024-06-28 DIAGNOSIS — R55 SYNCOPE, UNSPECIFIED SYNCOPE TYPE: ICD-10-CM

## 2024-06-28 LAB
ALBUMIN SERPL-MCNC: 4.7 G/DL (ref 3.5–4.6)
ALP SERPL-CCNC: 62 U/L (ref 35–104)
ALT SERPL-CCNC: 16 U/L (ref 0–41)
ANION GAP SERPL CALCULATED.3IONS-SCNC: 10 MEQ/L (ref 9–15)
AST SERPL-CCNC: 19 U/L (ref 0–40)
BASOPHILS # BLD: 0.1 K/UL (ref 0–0.2)
BASOPHILS NFR BLD: 0.8 %
BILIRUB SERPL-MCNC: 0.6 MG/DL (ref 0.2–0.7)
BUN SERPL-MCNC: 19 MG/DL (ref 6–20)
CALCIUM SERPL-MCNC: 9.4 MG/DL (ref 8.5–9.9)
CHLORIDE SERPL-SCNC: 101 MEQ/L (ref 95–107)
CK SERPL-CCNC: 256 U/L (ref 0–190)
CO2 SERPL-SCNC: 29 MEQ/L (ref 20–31)
CREAT SERPL-MCNC: 1.07 MG/DL (ref 0.7–1.2)
EOSINOPHIL # BLD: 0.1 K/UL (ref 0–0.7)
EOSINOPHIL NFR BLD: 2.3 %
ERYTHROCYTE [DISTWIDTH] IN BLOOD BY AUTOMATED COUNT: 12.3 % (ref 11.5–14.5)
GLOBULIN SER CALC-MCNC: 2.2 G/DL (ref 2.3–3.5)
GLUCOSE SERPL-MCNC: 85 MG/DL (ref 70–99)
HCT VFR BLD AUTO: 43.3 % (ref 42–52)
HGB BLD-MCNC: 14.8 G/DL (ref 14–18)
LYMPHOCYTES # BLD: 1.8 K/UL (ref 1–4.8)
LYMPHOCYTES NFR BLD: 29.5 %
MCH RBC QN AUTO: 29.7 PG (ref 27–31.3)
MCHC RBC AUTO-ENTMCNC: 34.2 % (ref 33–37)
MCV RBC AUTO: 86.9 FL (ref 79–92.2)
MONOCYTES # BLD: 0.6 K/UL (ref 0.2–0.8)
MONOCYTES NFR BLD: 9.4 %
NEUTROPHILS # BLD: 3.6 K/UL (ref 1.4–6.5)
NEUTS SEG NFR BLD: 57.8 %
PLATELET # BLD AUTO: 311 K/UL (ref 130–400)
POTASSIUM SERPL-SCNC: 4.3 MEQ/L (ref 3.4–4.9)
PROT SERPL-MCNC: 6.9 G/DL (ref 6.3–8)
RBC # BLD AUTO: 4.98 M/UL (ref 4.7–6.1)
SODIUM SERPL-SCNC: 140 MEQ/L (ref 135–144)
TSH REFLEX: 1 UIU/ML (ref 0.44–3.86)
WBC # BLD AUTO: 6.1 K/UL (ref 4.8–10.8)

## 2024-06-28 PROCEDURE — G8419 CALC BMI OUT NRM PARAM NOF/U: HCPCS | Performed by: NURSE PRACTITIONER

## 2024-06-28 PROCEDURE — 4004F PT TOBACCO SCREEN RCVD TLK: CPT | Performed by: NURSE PRACTITIONER

## 2024-06-28 PROCEDURE — 99203 OFFICE O/P NEW LOW 30 MIN: CPT | Performed by: NURSE PRACTITIONER

## 2024-06-28 PROCEDURE — G8427 DOCREV CUR MEDS BY ELIG CLIN: HCPCS | Performed by: NURSE PRACTITIONER
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
## 2024-06-29 LAB
ESTIMATED AVERAGE GLUCOSE: 85 MG/DL
HBA1C MFR BLD: 4.6 % (ref 4–6)

## 2024-07-04 ASSESSMENT — ENCOUNTER SYMPTOMS
BLOOD IN STOOL: 0
PHOTOPHOBIA: 0
CHEST TIGHTNESS: 0
VOMITING: 0
SINUS PAIN: 0
SORE THROAT: 0
RHINORRHEA: 0
SHORTNESS OF BREATH: 0
ABDOMINAL PAIN: 0
NAUSEA: 0
EYE PAIN: 0
CONSTIPATION: 0
COUGH: 0
DIARRHEA: 0
TROUBLE SWALLOWING: 0
EYE REDNESS: 0
SINUS PRESSURE: 0

## 2024-07-05 ENCOUNTER — OFFICE VISIT (OUTPATIENT)
Dept: FAMILY MEDICINE CLINIC | Age: 24
End: 2024-07-05

## 2024-07-05 ENCOUNTER — HOSPITAL ENCOUNTER (OUTPATIENT)
Dept: GENERAL RADIOLOGY | Age: 24
End: 2024-07-05
Payer: COMMERCIAL

## 2024-07-05 VITALS
DIASTOLIC BLOOD PRESSURE: 70 MMHG | SYSTOLIC BLOOD PRESSURE: 124 MMHG | HEART RATE: 76 BPM | HEIGHT: 67 IN | BODY MASS INDEX: 26.34 KG/M2 | TEMPERATURE: 98.6 F | OXYGEN SATURATION: 97 %

## 2024-07-05 DIAGNOSIS — S63.693A OTHER SPRAIN OF LEFT MIDDLE FINGER, INITIAL ENCOUNTER: Primary | ICD-10-CM

## 2024-07-05 DIAGNOSIS — S63.693A OTHER SPRAIN OF LEFT MIDDLE FINGER, INITIAL ENCOUNTER: ICD-10-CM

## 2024-07-05 PROCEDURE — 73140 X-RAY EXAM OF FINGER(S): CPT

## 2024-07-05 ASSESSMENT — ENCOUNTER SYMPTOMS: RESPIRATORY NEGATIVE: 1

## 2024-07-05 NOTE — PROGRESS NOTES
Left hand: Swelling, tenderness and bony tenderness (left middle PIP) present. Decreased range of motion. Decreased strength of finger abduction. Normal capillary refill.   Skin:     General: Skin is warm and dry.   Neurological:      General: No focal deficit present.      Mental Status: He is alert and oriented to person, place, and time.   Psychiatric:         Mood and Affect: Mood normal.         Behavior: Behavior normal.         Thought Content: Thought content normal.         Judgment: Judgment normal.         ASSESSMENT/PLAN:  1. Other sprain of left middle finger, initial encounter  -     Ambulatory referral to Orthopedic Surgery  -     External Referral to Orthopedic Surgery  -     XR FINGER LEFT (MIN 2 VIEWS); Future  -     LA APPLICATION FINGER SPLINT STATIC  RICE (Rest, Ice, Compress, Elevate) care  Motrin/ tylenol prn for pain and swelling  If provided with splint/ brace please wear until evaluated by ortho.   F/u ortho         Electronically signed by VINNY Donis CNP on 7/5/24 at 2:27 PM EDT

## 2024-07-09 ENCOUNTER — OFFICE VISIT (OUTPATIENT)
Dept: ORTHOPEDIC SURGERY | Age: 24
End: 2024-07-09
Payer: COMMERCIAL

## 2024-07-09 VITALS
OXYGEN SATURATION: 98 % | TEMPERATURE: 97.1 F | WEIGHT: 168 LBS | BODY MASS INDEX: 26.37 KG/M2 | HEIGHT: 67 IN | HEART RATE: 91 BPM

## 2024-07-09 DIAGNOSIS — S69.92XA INJURY OF LEFT MIDDLE FINGER, INITIAL ENCOUNTER: Primary | ICD-10-CM

## 2024-07-09 PROCEDURE — G8419 CALC BMI OUT NRM PARAM NOF/U: HCPCS | Performed by: PHYSICIAN ASSISTANT

## 2024-07-09 PROCEDURE — 4004F PT TOBACCO SCREEN RCVD TLK: CPT | Performed by: PHYSICIAN ASSISTANT

## 2024-07-09 PROCEDURE — 99203 OFFICE O/P NEW LOW 30 MIN: CPT | Performed by: PHYSICIAN ASSISTANT

## 2024-07-09 PROCEDURE — G8427 DOCREV CUR MEDS BY ELIG CLIN: HCPCS | Performed by: PHYSICIAN ASSISTANT
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
## 2024-07-26 ENCOUNTER — HOSPITAL ENCOUNTER (OUTPATIENT)
Dept: CT IMAGING | Age: 24
End: 2024-07-26
Payer: COMMERCIAL

## 2024-07-26 ENCOUNTER — HOSPITAL ENCOUNTER (OUTPATIENT)
Age: 24
End: 2024-07-26
Payer: COMMERCIAL

## 2024-07-26 DIAGNOSIS — R55 SYNCOPE, UNSPECIFIED SYNCOPE TYPE: ICD-10-CM

## 2024-07-26 PROCEDURE — 93242 EXT ECG>48HR<7D RECORDING: CPT

## 2024-07-26 PROCEDURE — 70450 CT HEAD/BRAIN W/O DYE: CPT

## 2024-08-02 ENCOUNTER — TELEPHONE (OUTPATIENT)
Dept: ADMINISTRATIVE | Age: 24
End: 2024-08-02

## 2024-08-02 NOTE — TELEPHONE ENCOUNTER
Patient had CT Head done on 7/26/24 and his insurance is requesting a peer to peer review for approval. Please contact [employer] at [phone] with tracking # [id number] to complete the review. Thank you.

## 2024-08-02 NOTE — TELEPHONE ENCOUNTER
Did peer to peer, addended note per requirements.  Can we please call the number above and find out how we can get this information return to them for approval.